# Patient Record
Sex: FEMALE | Race: BLACK OR AFRICAN AMERICAN | NOT HISPANIC OR LATINO | Employment: FULL TIME | URBAN - METROPOLITAN AREA
[De-identification: names, ages, dates, MRNs, and addresses within clinical notes are randomized per-mention and may not be internally consistent; named-entity substitution may affect disease eponyms.]

---

## 2020-01-02 ENCOUNTER — OFFICE VISIT (OUTPATIENT)
Dept: OBSTETRICS AND GYNECOLOGY | Facility: CLINIC | Age: 25
End: 2020-01-02
Payer: MEDICAID

## 2020-01-02 VITALS
SYSTOLIC BLOOD PRESSURE: 124 MMHG | HEIGHT: 65 IN | WEIGHT: 129.63 LBS | BODY MASS INDEX: 21.6 KG/M2 | DIASTOLIC BLOOD PRESSURE: 88 MMHG

## 2020-01-02 DIAGNOSIS — Z11.3 SCREEN FOR STD (SEXUALLY TRANSMITTED DISEASE): Primary | ICD-10-CM

## 2020-01-02 DIAGNOSIS — N76.0 ACUTE VAGINITIS: ICD-10-CM

## 2020-01-02 DIAGNOSIS — R10.2 PELVIC PAIN: ICD-10-CM

## 2020-01-02 LAB
C TRACH DNA SPEC QL NAA+PROBE: NOT DETECTED
N GONORRHOEA DNA SPEC QL NAA+PROBE: NOT DETECTED

## 2020-01-02 PROCEDURE — 99204 PR OFFICE/OUTPT VISIT, NEW, LEVL IV, 45-59 MIN: ICD-10-PCS | Mod: S$PBB,,, | Performed by: NURSE PRACTITIONER

## 2020-01-02 PROCEDURE — 99204 OFFICE O/P NEW MOD 45 MIN: CPT | Mod: S$PBB,,, | Performed by: NURSE PRACTITIONER

## 2020-01-02 PROCEDURE — 99999 PR PBB SHADOW E&M-NEW PATIENT-LVL III: ICD-10-PCS | Mod: PBBFAC,,, | Performed by: NURSE PRACTITIONER

## 2020-01-02 PROCEDURE — 87481 CANDIDA DNA AMP PROBE: CPT | Mod: 59

## 2020-01-02 PROCEDURE — 99999 PR PBB SHADOW E&M-NEW PATIENT-LVL III: CPT | Mod: PBBFAC,,, | Performed by: NURSE PRACTITIONER

## 2020-01-02 PROCEDURE — 87661 TRICHOMONAS VAGINALIS AMPLIF: CPT

## 2020-01-02 PROCEDURE — 87491 CHLMYD TRACH DNA AMP PROBE: CPT

## 2020-01-02 PROCEDURE — 99203 OFFICE O/P NEW LOW 30 MIN: CPT | Mod: PBBFAC,PN | Performed by: NURSE PRACTITIONER

## 2020-01-03 LAB
BACTERIAL VAGINOSIS DNA: POSITIVE
CANDIDA GLABRATA DNA: NEGATIVE
CANDIDA KRUSEI DNA: NEGATIVE
CANDIDA RRNA VAG QL PROBE: NEGATIVE
T VAGINALIS RRNA GENITAL QL PROBE: NEGATIVE

## 2020-01-10 ENCOUNTER — PATIENT MESSAGE (OUTPATIENT)
Dept: OBSTETRICS AND GYNECOLOGY | Facility: CLINIC | Age: 25
End: 2020-01-10

## 2020-01-10 DIAGNOSIS — B96.89 BV (BACTERIAL VAGINOSIS): Primary | ICD-10-CM

## 2020-01-10 DIAGNOSIS — N76.0 BV (BACTERIAL VAGINOSIS): Primary | ICD-10-CM

## 2020-01-10 DIAGNOSIS — B37.31 YEAST VAGINITIS: Primary | ICD-10-CM

## 2020-01-10 RX ORDER — METRONIDAZOLE 500 MG/1
500 TABLET ORAL 2 TIMES DAILY
Qty: 14 TABLET | Refills: 0 | Status: SHIPPED | OUTPATIENT
Start: 2020-01-10 | End: 2020-01-17

## 2020-01-10 RX ORDER — FLUCONAZOLE 150 MG/1
150 TABLET ORAL ONCE
Qty: 1 TABLET | Refills: 1 | Status: SHIPPED | OUTPATIENT
Start: 2020-01-10 | End: 2020-01-10 | Stop reason: CLARIF
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
## 2020-12-07 ENCOUNTER — OFFICE VISIT (OUTPATIENT)
Dept: OBSTETRICS AND GYNECOLOGY | Facility: CLINIC | Age: 25
End: 2020-12-07
Payer: MEDICAID

## 2020-12-07 VITALS
DIASTOLIC BLOOD PRESSURE: 66 MMHG | WEIGHT: 125.25 LBS | HEIGHT: 65 IN | BODY MASS INDEX: 20.87 KG/M2 | SYSTOLIC BLOOD PRESSURE: 110 MMHG

## 2020-12-07 DIAGNOSIS — F31.9 BIPOLAR AFFECTIVE DISORDER, REMISSION STATUS UNSPECIFIED: ICD-10-CM

## 2020-12-07 DIAGNOSIS — R10.2 PELVIC PAIN: Primary | ICD-10-CM

## 2020-12-07 DIAGNOSIS — K57.90 DIVERTICULOSIS: ICD-10-CM

## 2020-12-07 DIAGNOSIS — N93.9 VAGINAL BLEEDING: ICD-10-CM

## 2020-12-07 DIAGNOSIS — R10.31 RIGHT LOWER QUADRANT PAIN: ICD-10-CM

## 2020-12-07 DIAGNOSIS — K92.1 BLOOD IN STOOL: ICD-10-CM

## 2020-12-07 LAB
B-HCG UR QL: NEGATIVE
BILIRUB SERPL-MCNC: NORMAL MG/DL
BILIRUB UR QL STRIP: NEGATIVE
BLOOD URINE, POC: NORMAL
CLARITY UR: CLEAR
CLARITY, POC UA: CLEAR
COLOR UR: YELLOW
COLOR, POC UA: NORMAL
CTP QC/QA: YES
GLUCOSE UR QL STRIP: NEGATIVE
GLUCOSE UR QL STRIP: NORMAL
HGB UR QL STRIP: NEGATIVE
KETONES UR QL STRIP: NEGATIVE
KETONES UR QL STRIP: NORMAL
LEUKOCYTE ESTERASE UR QL STRIP: NEGATIVE
LEUKOCYTE ESTERASE URINE, POC: NORMAL
NITRITE UR QL STRIP: NEGATIVE
NITRITE, POC UA: NORMAL
PH UR STRIP: 8 [PH] (ref 5–8)
PH, POC UA: 8
PROT UR QL STRIP: NEGATIVE
PROTEIN, POC: NORMAL
SP GR UR STRIP: 1.01 (ref 1–1.03)
SPECIFIC GRAVITY, POC UA: 1.01
URN SPEC COLLECT METH UR: NORMAL
UROBILINOGEN UR STRIP-ACNC: NEGATIVE EU/DL
UROBILINOGEN, POC UA: NORMAL

## 2020-12-07 PROCEDURE — 87086 URINE CULTURE/COLONY COUNT: CPT

## 2020-12-07 PROCEDURE — 99999 PR PBB SHADOW E&M-EST. PATIENT-LVL III: CPT | Mod: PBBFAC,,, | Performed by: STUDENT IN AN ORGANIZED HEALTH CARE EDUCATION/TRAINING PROGRAM

## 2020-12-07 PROCEDURE — 99213 OFFICE O/P EST LOW 20 MIN: CPT | Mod: PBBFAC,PN | Performed by: STUDENT IN AN ORGANIZED HEALTH CARE EDUCATION/TRAINING PROGRAM

## 2020-12-07 PROCEDURE — 99214 OFFICE O/P EST MOD 30 MIN: CPT | Mod: S$PBB,,, | Performed by: STUDENT IN AN ORGANIZED HEALTH CARE EDUCATION/TRAINING PROGRAM

## 2020-12-07 PROCEDURE — 81002 URINALYSIS NONAUTO W/O SCOPE: CPT | Mod: PBBFAC,PN | Performed by: STUDENT IN AN ORGANIZED HEALTH CARE EDUCATION/TRAINING PROGRAM

## 2020-12-07 PROCEDURE — 99214 PR OFFICE/OUTPT VISIT, EST, LEVL IV, 30-39 MIN: ICD-10-PCS | Mod: S$PBB,,, | Performed by: STUDENT IN AN ORGANIZED HEALTH CARE EDUCATION/TRAINING PROGRAM

## 2020-12-07 PROCEDURE — 99999 PR PBB SHADOW E&M-EST. PATIENT-LVL III: ICD-10-PCS | Mod: PBBFAC,,, | Performed by: STUDENT IN AN ORGANIZED HEALTH CARE EDUCATION/TRAINING PROGRAM

## 2020-12-07 RX ORDER — CIPROFLOXACIN 500 MG/1
500 TABLET ORAL
COMMUNITY
Start: 2020-11-07 | End: 2023-01-24 | Stop reason: CLARIF

## 2020-12-07 NOTE — PROGRESS NOTES
"History & Physical  Gynecology      SUBJECTIVE:     Chief Complaint: Ovarian Cyst and Vaginal Bleeding       History of Present Illness:  Fredis Moreno is a 25 y.o.  here for right sided pelvic/abdominal pain x 2 months. Symptoms are worsening. She has not tried anything to help the pain although she has gone to the ED several times ([facility], [facility]).  Reports CT scan without contrast (iodine allergy) at [facility] showed diverticulosis as well as possible ovarian cysts +/- rupture.  Stated she left [facility] after 10 hours before any treatment was given. States she doesn't think she saw a doctor by that point.     She is having irregular vaginal bleeding though report she has "no cycles on the Mirena."  Also reports bloody stools. Reports 50 lb unintentional weight loss however upon chart review patient is 125 lb today and was 129 lb in January.  Endorses F/C at home, none currently.    Also struggling with stress (living situation), anger management, and grief after losing her mother 3 years ago.  Reports her physical symptoms always start/worsen with stress and improve when she's coping better. She has a history of mental illness/inpatient psych stay, currently on no meds and not seeing counselor. She does not want to be on medication but is willing to try outpatient counseling again. Denies SI/HI.      Review of patient's allergies indicates:   Allergen Reactions    Amoxicillin Hives    Aleve [naproxen sodium] Hives and Edema     Eye edema    Iodine     Motrin [ibuprofen] Hives     Eye edema       Past Medical History:   Diagnosis Date    Asthma     Eczema      Past Surgical History:   Procedure Laterality Date    HAND SURGERY       OB History        1    Para        Term                AB        Living   1       SAB        TAB        Ectopic        Multiple        Live Births                   Family History   Problem Relation Age of Onset    Breast cancer Maternal Grandmother     Breast " cancer Mother     Anemia Mother     Ovarian cancer Maternal Aunt     Ovarian cancer Paternal Aunt     Colon cancer Neg Hx      Social History     Tobacco Use    Smoking status: Never Smoker   Substance Use Topics    Alcohol use: Not Currently    Drug use: Not Currently     Types: Marijuana       Current Outpatient Medications   Medication Sig    ciprofloxacin HCl (CIPRO) 500 MG tablet 500 mg.    prenatal vit-iron fum-folic ac 27 mg iron- 0.8 mg Tab Take 1 tablet by mouth.     No current facility-administered medications for this visit.          Review of Systems:  Review of Systems   Constitutional: Positive for chills, fever and unexpected weight change. Negative for appetite change and fatigue.   HENT: Negative for nasal congestion.    Respiratory: Negative for cough and shortness of breath.    Cardiovascular: Negative for chest pain and leg swelling.   Gastrointestinal: Positive for abdominal pain, blood in stool, nausea and vomiting. Negative for bloating.   Genitourinary: Positive for pelvic pain. Negative for dysuria and vaginal discharge.   Musculoskeletal: Negative for myalgias.   Integumentary:  Negative for rash.   Neurological: Negative for syncope.   Psychiatric/Behavioral: Positive for depression.        OBJECTIVE:     Physical Exam:  Physical Exam  Exam conducted with a chaperone present.   Constitutional:       General: She is not in acute distress.     Appearance: Normal appearance. She is well-developed. She is not toxic-appearing.   HENT:      Head: Normocephalic and atraumatic.   Eyes:      Conjunctiva/sclera: Conjunctivae normal.   Pulmonary:      Effort: Pulmonary effort is normal. No respiratory distress.   Abdominal:      General: There is no distension.      Palpations: Abdomen is soft. There is no mass.      Tenderness: There is abdominal tenderness (RLQ). There is no guarding or rebound.      Hernia: No hernia is present.   Genitourinary:     General: Normal vulva.      Pubic Area:  No rash.       Labia:         Right: No rash, tenderness or lesion.         Left: No rash, tenderness or lesion.       Urethra: No prolapse, urethral pain, urethral swelling or urethral lesion.      Vagina: No vaginal discharge, tenderness or bleeding.      Cervix: No cervical motion tenderness, discharge, friability or lesion.      Uterus: Normal. Not enlarged and not tender.       Adnexa: Left adnexa normal.        Right: Tenderness present. No mass or fullness.          Left: No mass, tenderness or fullness.        Rectum: Normal. No mass, external hemorrhoid or internal hemorrhoid.   Musculoskeletal: Normal range of motion.         General: No tenderness.      Right lower leg: No edema.      Left lower leg: No edema.   Skin:     General: Skin is warm and dry.      Findings: No erythema.   Neurological:      Mental Status: She is alert and oriented to person, place, and time.   Psychiatric:         Mood and Affect: Mood normal.         Behavior: Behavior normal.           ASSESSMENT:       ICD-10-CM ICD-9-CM    1. Pelvic pain  R10.2 [id number] US Pelvis Complete Non OB      Urine culture      C. trachomatis/N. gonorrhoeae by AMP DNA      POCT urine pregnancy      Urinalysis   2. Vaginal bleeding  N93.9 623.8 POCT URINE DIPSTICK WITHOUT MICROSCOPE   3. Right lower quadrant pain  R10.31 789.03 US Pelvis Complete Non OB   4. Diverticulosis  K57.90 562.10 Ambulatory referral/consult to Gastroenterology      POCT urine pregnancy   5. Blood in stool  K92.1 578.1 Ambulatory referral/consult to Gastroenterology   6. Bipolar affective disorder, remission status unspecified  F31.9 296.80           Plan:      Fredis was seen today for ovarian cyst and vaginal bleeding.    Diagnoses and all orders for this visit:    Pelvic pain  -     US Pelvis Complete Non OB; Future  -     Urine culture  -     C. trachomatis/N. gonorrhoeae by AMP DNA  -     POCT urine pregnancy  -     Urinalysis      If issues with cysts, depending on cyst  type discussed CHC use to prevent ovulation.    Vaginal bleeding  -     POCT URINE DIPSTICK WITHOUT MICROSCOPE   Neg UPT    Right lower quadrant pain  -     US Pelvis Complete Non OB; Future    Diverticulosis  -     Ambulatory referral/consult to Gastroenterology; Future  -     POCT urine pregnancy    Blood in stool  -     Ambulatory referral/consult to Gastroenterology; Future    Bipolar affective disorder, remission status unspecified   Local behavioral health resources provided      Orders Placed This Encounter   Procedures    Urine culture    C. trachomatis/N. gonorrhoeae by AMP DNA    US Pelvis Complete Non OB    Urinalysis    Ambulatory referral/consult to Gastroenterology    POCT URINE DIPSTICK WITHOUT MICROSCOPE    POCT urine pregnancy       Follow up if symptoms worsen or fail to improve.     Counseling time: 45 minutes    Mary Scherer

## 2020-12-09 LAB
BACTERIA UR CULT: NO GROWTH
C TRACH RRNA SPEC QL NAA+PROBE: NEGATIVE
N GONORRHOEA RRNA SPEC QL NAA+PROBE: NEGATIVE

## 2020-12-10 ENCOUNTER — TELEPHONE (OUTPATIENT)
Dept: OBSTETRICS AND GYNECOLOGY | Facility: CLINIC | Age: 25
End: 2020-12-10

## 2020-12-11 ENCOUNTER — OFFICE VISIT (OUTPATIENT)
Dept: OBSTETRICS AND GYNECOLOGY | Facility: CLINIC | Age: 25
End: 2020-12-11
Payer: MEDICAID

## 2020-12-11 VITALS
DIASTOLIC BLOOD PRESSURE: 78 MMHG | HEIGHT: 65 IN | BODY MASS INDEX: 21.86 KG/M2 | WEIGHT: 131.19 LBS | SYSTOLIC BLOOD PRESSURE: 116 MMHG

## 2020-12-11 DIAGNOSIS — Z30.49 ENCOUNTER FOR SURVEILLANCE OF OTHER CONTRACEPTIVE: Primary | ICD-10-CM

## 2020-12-11 PROCEDURE — 99499 NO LOS: ICD-10-PCS | Mod: S$PBB,,, | Performed by: ADVANCED PRACTICE MIDWIFE

## 2020-12-11 PROCEDURE — 99499 UNLISTED E&M SERVICE: CPT | Mod: S$PBB,,, | Performed by: ADVANCED PRACTICE MIDWIFE

## 2020-12-11 PROCEDURE — 99213 OFFICE O/P EST LOW 20 MIN: CPT | Mod: PBBFAC,PN | Performed by: ADVANCED PRACTICE MIDWIFE

## 2020-12-11 PROCEDURE — 99999 PR PBB SHADOW E&M-EST. PATIENT-LVL III: CPT | Mod: PBBFAC,,, | Performed by: ADVANCED PRACTICE MIDWIFE

## 2020-12-11 PROCEDURE — 99999 PR PBB SHADOW E&M-EST. PATIENT-LVL III: ICD-10-PCS | Mod: PBBFAC,,, | Performed by: ADVANCED PRACTICE MIDWIFE

## 2020-12-11 NOTE — PROGRESS NOTES
Pt in today for evaluation per gastroenterology as recommended per Dr. Krishnamurthy on 12/07/20 sec to bloody stool and rectal bleeding. Discussed US results with IUD in lower uterine segment and recommendation that we take it out. Pt reports it was placed in 2016 at [facility]. Pt desires to have another IUD placed and desires to wait for removal at the same  time. Advised that is probably contributing to her vaginal bleeding and is not as effective in preventing pregnancy. Pt aware- reports not currently sexually active. Device authorization placed. GI appt scheduled.

## 2020-12-16 ENCOUNTER — LAB VISIT (OUTPATIENT)
Dept: LAB | Facility: HOSPITAL | Age: 25
End: 2020-12-16
Payer: MEDICAID

## 2020-12-16 ENCOUNTER — OFFICE VISIT (OUTPATIENT)
Dept: GASTROENTEROLOGY | Facility: CLINIC | Age: 25
End: 2020-12-16
Payer: MEDICAID

## 2020-12-16 VITALS
SYSTOLIC BLOOD PRESSURE: 95 MMHG | BODY MASS INDEX: 21.41 KG/M2 | WEIGHT: 128.5 LBS | HEART RATE: 85 BPM | HEIGHT: 65 IN | DIASTOLIC BLOOD PRESSURE: 69 MMHG

## 2020-12-16 DIAGNOSIS — R19.7 DIARRHEA, UNSPECIFIED TYPE: Primary | ICD-10-CM

## 2020-12-16 DIAGNOSIS — K92.1 BLOOD IN STOOL: ICD-10-CM

## 2020-12-16 DIAGNOSIS — R10.9 ABDOMINAL PAIN, UNSPECIFIED ABDOMINAL LOCATION: ICD-10-CM

## 2020-12-16 DIAGNOSIS — R15.2 FECAL URGENCY: ICD-10-CM

## 2020-12-16 DIAGNOSIS — R19.7 DIARRHEA, UNSPECIFIED TYPE: ICD-10-CM

## 2020-12-16 LAB
CRP SERPL-MCNC: 1.1 MG/L (ref 0–8.2)
IGA SERPL-MCNC: 238 MG/DL (ref 40–350)

## 2020-12-16 PROCEDURE — 86140 C-REACTIVE PROTEIN: CPT

## 2020-12-16 PROCEDURE — 99204 OFFICE O/P NEW MOD 45 MIN: CPT | Mod: S$PBB,,, | Performed by: NURSE PRACTITIONER

## 2020-12-16 PROCEDURE — 99213 OFFICE O/P EST LOW 20 MIN: CPT | Mod: PBBFAC | Performed by: NURSE PRACTITIONER

## 2020-12-16 PROCEDURE — 99999 PR PBB SHADOW E&M-EST. PATIENT-LVL III: CPT | Mod: PBBFAC,,, | Performed by: NURSE PRACTITIONER

## 2020-12-16 PROCEDURE — 99204 PR OFFICE/OUTPT VISIT, NEW, LEVL IV, 45-59 MIN: ICD-10-PCS | Mod: S$PBB,,, | Performed by: NURSE PRACTITIONER

## 2020-12-16 PROCEDURE — 99999 PR PBB SHADOW E&M-EST. PATIENT-LVL III: ICD-10-PCS | Mod: PBBFAC,,, | Performed by: NURSE PRACTITIONER

## 2020-12-16 PROCEDURE — 83516 IMMUNOASSAY NONANTIBODY: CPT

## 2020-12-16 PROCEDURE — 36415 COLL VENOUS BLD VENIPUNCTURE: CPT

## 2020-12-16 PROCEDURE — 82784 ASSAY IGA/IGD/IGG/IGM EACH: CPT

## 2020-12-16 RX ORDER — DICYCLOMINE HYDROCHLORIDE 10 MG/1
10 CAPSULE ORAL 3 TIMES DAILY
Qty: 90 CAPSULE | Refills: 1 | Status: SHIPPED | OUTPATIENT
Start: 2020-12-16 | End: 2021-02-14

## 2020-12-16 NOTE — PROGRESS NOTES
"    [facility] Consultation Note    Reason for Consult:  The primary encounter diagnosis was Diarrhea, unspecified type. Diagnoses of Blood in stool, Abdominal pain, unspecified abdominal location, and Fecal urgency were also pertinent to this visit.    PCP:   Primary Doctor No   [id number] / [place] [address]    Referring MD:  Mary Scherer Md  [address] [address]    Initial History of Present Illness (HPI):  This is a 25 y.o. female here for evaluation of a number of GI complaints. New patient.  Beginning of October  Patient reports she has developed hematochezia, abdominal pain and diarrhea.   She has diarrhea everyday. Has more than 5 Bms in a day. +nocturnal BMs  +mucous in the stools for years. Reports she self diagnosed herself with IBS in High school  Abdominal pain - post prandial. Pain does improve after a BM. But she continues with rectal pain  +urgency  NSAID usage - goodys, but she is allergic to ibuprofen    She reports a 20 lb weight loss, but       ROS:  Constitutional: +fevers, +chills, +weight loss  ENT:  No sore throat, no PND  Eyes:+has eye redness two days ago  CV: + chest pain, no palpitation  Pulm: No cough, + shortness of breath, no wheezing  Ophtho: No vision changes  GI: see HPI  Derm: No rash, no itching  Heme: No easy bruising  MSK: + occasional joint pains  : + dysuria, No hematuria  Neuro+syncope, No seizure  Psych: +anxiety, + depression "I have this very bad" per pt    Medical History:  has a past medical history of Asthma and Eczema.    Surgical History:  has a past surgical history that includes Hand surgery.    Family History: family history includes Anemia in her mother; Breast cancer in her maternal grandmother and mother; Ovarian cancer in her maternal aunt and paternal aunt..     Social History:  reports that she has never smoked. She does not have any smokeless tobacco history on file. She reports previous alcohol " "use. She reports previous drug use. Drug: Marijuana.    Review of patient's allergies indicates:   Allergen Reactions    Amoxicillin Hives    Aleve [naproxen sodium] Hives and Edema     Eye edema    Iodine     Motrin [ibuprofen] Hives     Eye edema       Medication List with Changes/Refills   New Medications    DICYCLOMINE (BENTYL) 10 MG CAPSULE    Take 1 capsule (10 mg total) by mouth 3 (three) times daily.   Current Medications    CIPROFLOXACIN HCL (CIPRO) 500 MG TABLET    500 mg.    PRENATAL VIT-IRON FUM-FOLIC AC 27 MG IRON- 0.8 MG TAB    Take 1 tablet by mouth.         Objective Findings:    Vital Signs   reviewed:  BP 95/69 (BP Location: Right arm)   Pulse 85   Ht 5' 5" (1.651 m)   Wt 58.3 kg (128 lb 8.5 oz)   BMI 21.39 kg/m²   Body mass index is 21.39 kg/m².    Physical Exam:  General Appearance: Well appearing, NAD noted  Eyes:    No scleral icterus  ENT:  No lesions or masses   Lungs: BBS CTA ,  no wheezes  Heart:  HRRR, S1 & S2 normal, no murmurs heard  Abdomen:  Non distended, soft, no guarding, no rebound, no tenderness, no appreciated ascites  Musculoskeletal:  No major joint deformities  Skin: No petechiae or rash on exposed skin areas  Neurologic:  Alert and oriented x4  Psychiatric:  Normal speech mentation and affect    Labs reviewed:  No results found for: WBC, HGB, HCT, PLT, CHOL, TRIG, HDL, LDLDIRECT, ALT, AST, NA, K, CL, CREATININE, BUN, CO2, TSH, PSA, INR, GLUF, HGBA1C, MICROALBUR     CBC, cmp were reviewed in Care everywhere normal 12/2020    Imaging reviewed:  None    Endoscopy reviewed:    None    Medical Decision Making:    Assessment:    Fredis Moreno is a 25 y.o. female here for:    1. Diarrhea, unspecified type    2. Blood in stool    3. Abdominal pain, unspecified abdominal location    4. Fecal urgency       Chronic sx but has not had evaluation.  The main thing to rule out here is IBD.  She reports a 20 lb weight loss which is concerning however chart review does not " support a 20 lb weight loss.  She also reports that the stool always has a significant amount of blood.   CBC& cmp reviewed in Care everywhere from earlier this month was unremarkable    Recommendations:  1. Labs and stool studies  2. Colonoscopy  3. Trial bentyl up to TID before meals. Pt cautioned this could cause drowsiness    F/u pending order results     Order summary:  Orders Placed This Encounter    Stool culture    Clostridium difficile EIA    Calprotectin, Stool    C-Reactive Protein    Stool Exam-Ova,Cysts,Parasites    WBC, Stool    Giardia / Cryptosporidum, EIA    Tissue Transglutaminase, IgA    IgA    dicyclomine (BENTYL) 10 MG capsule    Case Request Endoscopy: COLONOSCOPY         Thank you for allowing me to participate in the care of Fredis Garcia, SAGARP-C

## 2020-12-16 NOTE — LETTER
December 16, 2020      Mary Krishnamurthy MD  [address]  [facility] [address]           [facility] [address]th [address]  [address]  [facility] [address]  Phone: [phone]  Fax: [phone]          Patient: Fredis Moreno   MR Number: [id number]   YOB: 1995   Date of Visit: 12/16/2020       Dear Dr. Mary Krishnamurthy:    Thank you for referring Fredis Moreno to me for evaluation. Attached you will find relevant portions of my assessment and plan of care.    If you have questions, please do not hesitate to call me. I look forward to following Fredis Moreno along with you.    Sincerely,    Kay Garcia, YARELIS    Enclosure  CC:  No Recipients    If you would like to receive this communication electronically, please contact [email or web address] or [phone] to request more information on [employer] access.    For providers and/or their staff who would like to refer a patient to [facility], please contact us through our one-stop-shop provider referral line, [facility], at [phone].    If you feel you have received this communication in error or would no longer like to receive these types of communications, please e-mail [email or web address]

## 2020-12-17 ENCOUNTER — APPOINTMENT (OUTPATIENT)
Dept: LAB | Facility: HOSPITAL | Age: 25
End: 2020-12-17
Payer: MEDICAID

## 2020-12-18 ENCOUNTER — TELEPHONE (OUTPATIENT)
Dept: GASTROENTEROLOGY | Facility: CLINIC | Age: 25
End: 2020-12-18

## 2020-12-18 NOTE — TELEPHONE ENCOUNTER
----- Message from Radha Acosta sent at 12/18/2020  1:24 AM CST -----  Regarding: Lab Client Services  Contact: [phone]  Good Morning,    My name is Radha Acosta I work in the lab. We received a specimen for Clostridium Difficile test. The test was unable to be performed due to the stool sample was formed stool. If have any questions regarding this, please contact Lab Client Services at [phone].     Thank you

## 2020-12-21 ENCOUNTER — TELEPHONE (OUTPATIENT)
Dept: GASTROENTEROLOGY | Facility: CLINIC | Age: 25
End: 2020-12-21

## 2020-12-21 DIAGNOSIS — Z01.818 PRE-OP TESTING: ICD-10-CM

## 2020-12-21 DIAGNOSIS — Z12.11 SPECIAL SCREENING FOR MALIGNANT NEOPLASMS, COLON: Primary | ICD-10-CM

## 2020-12-21 LAB — TTG IGA SER-ACNC: 5 UNITS

## 2020-12-21 RX ORDER — SODIUM, POTASSIUM,MAG SULFATES 17.5-3.13G
1 SOLUTION, RECONSTITUTED, ORAL ORAL DAILY
Qty: 1 KIT | Refills: 0 | Status: SHIPPED | OUTPATIENT
Start: 2020-12-21 | End: 2020-12-23

## 2020-12-21 NOTE — TELEPHONE ENCOUNTER
MA contacted Lab Services and spoke with Rosalie . she stated that pt stool was not sent to send out and that the stool test for parsites was canceled due to stool sitting too long.       ----- Message from Kay Garcia NP sent at 12/21/2020  8:36 AM CST -----  Regarding: FW: Lab Client Services  Will you call them please. Thanks  ----- Message -----  From: Madi Lerma  Sent: 12/20/2020   5:50 PM CST  To: Kay Garcia NP, #  Subject: Lab Client Services                              Hi my name is Madi I work in the Lab Client Services. We had an issue with a lab on your patient. If you could please give us a call at [phone] that would be great. ANYONE in my department can help. Thank You.

## 2020-12-21 NOTE — TELEPHONE ENCOUNTER
MA contacted pt to discus stool test with pt. Pt did not answer, MA LVM for pt to give the clinic a call .

## 2020-12-21 NOTE — TELEPHONE ENCOUNTER
MA contacted pt back. MA wanted to give lab result and discuss stool test with pt. No answer, MA LVM for pt to give the clinic a call .      ----- Message from Kay Garcia NP sent at 12/21/2020 12:25 PM CST -----  Labs for celiac disease normal

## 2020-12-30 ENCOUNTER — TELEPHONE (OUTPATIENT)
Dept: GASTROENTEROLOGY | Facility: CLINIC | Age: 25
End: 2020-12-30

## 2020-12-30 NOTE — TELEPHONE ENCOUNTER
MA contacted lab services and spoke with Ernestina . The C.Diff test was not ran because pt stool was a formed stool. Ernestina transferred MA to Micro lab to speak to them about the Giardia stool sample. [facility] a [facility] from the lab to notify them of testing . This is not our fault here in the GI  clinic she stated that a specimen label should have been sent over with the stool sample so that they would know that test needed to be ran.         ----- Message from Kay Garcia NP sent at 12/30/2020  8:31 AM CST -----  Call lab about the giardia stool test. Why wasn't it ran.

## 2020-12-30 NOTE — TELEPHONE ENCOUNTER
----- Message from Kay Garcia NP sent at 12/30/2020  8:31 AM CST -----  Call lab about the giardia stool test. Why wasn't it ran.

## 2021-01-04 ENCOUNTER — TELEPHONE (OUTPATIENT)
Dept: ENDOSCOPY | Facility: HOSPITAL | Age: 26
End: 2021-01-04

## 2021-01-04 DIAGNOSIS — Z12.11 SPECIAL SCREENING FOR MALIGNANT NEOPLASMS, COLON: Primary | ICD-10-CM

## 2021-01-04 DIAGNOSIS — Z01.818 PRE-OP TESTING: ICD-10-CM

## 2021-01-04 RX ORDER — SODIUM, POTASSIUM,MAG SULFATES 17.5-3.13G
1 SOLUTION, RECONSTITUTED, ORAL ORAL DAILY
Qty: 1 KIT | Refills: 0 | Status: SHIPPED | OUTPATIENT
Start: 2021-01-04 | End: 2021-01-06

## 2021-01-07 ENCOUNTER — TELEPHONE (OUTPATIENT)
Dept: GASTROENTEROLOGY | Facility: CLINIC | Age: 26
End: 2021-01-07

## 2021-01-08 ENCOUNTER — LAB VISIT (OUTPATIENT)
Dept: INTERNAL MEDICINE | Facility: CLINIC | Age: 26
End: 2021-01-08
Payer: MEDICAID

## 2021-01-08 ENCOUNTER — TELEPHONE (OUTPATIENT)
Dept: GASTROENTEROLOGY | Facility: CLINIC | Age: 26
End: 2021-01-08

## 2021-01-08 DIAGNOSIS — Z01.818 PRE-OP TESTING: ICD-10-CM

## 2021-01-08 PROCEDURE — U0003 INFECTIOUS AGENT DETECTION BY NUCLEIC ACID (DNA OR RNA); SEVERE ACUTE RESPIRATORY SYNDROME CORONAVIRUS 2 (SARS-COV-2) (CORONAVIRUS DISEASE [COVID-19]), AMPLIFIED PROBE TECHNIQUE, MAKING USE OF HIGH THROUGHPUT TECHNOLOGIES AS DESCRIBED BY CMS-2020-01-R: HCPCS

## 2021-01-09 LAB — SARS-COV-2 RNA RESP QL NAA+PROBE: NOT DETECTED

## 2021-01-11 ENCOUNTER — ANESTHESIA (OUTPATIENT)
Dept: ENDOSCOPY | Facility: HOSPITAL | Age: 26
End: 2021-01-11
Payer: MEDICAID

## 2021-01-11 ENCOUNTER — HOSPITAL ENCOUNTER (OUTPATIENT)
Facility: HOSPITAL | Age: 26
Discharge: HOME | End: 2021-01-11
Attending: INTERNAL MEDICINE | Admitting: INTERNAL MEDICINE
Payer: MEDICAID

## 2021-01-11 ENCOUNTER — ANESTHESIA EVENT (OUTPATIENT)
Dept: ENDOSCOPY | Facility: HOSPITAL | Age: 26
End: 2021-01-11
Payer: MEDICAID

## 2021-01-11 VITALS
WEIGHT: 123 LBS | SYSTOLIC BLOOD PRESSURE: 115 MMHG | HEIGHT: 65 IN | DIASTOLIC BLOOD PRESSURE: 73 MMHG | HEART RATE: 83 BPM | RESPIRATION RATE: 16 BRPM | BODY MASS INDEX: 20.49 KG/M2 | TEMPERATURE: 98 F | OXYGEN SATURATION: 99 %

## 2021-01-11 DIAGNOSIS — R19.7 DIARRHEA: ICD-10-CM

## 2021-01-11 LAB
B-HCG UR QL: NEGATIVE
CTP QC/QA: YES

## 2021-01-11 PROCEDURE — E9220 PRA ENDO ANESTHESIA: ICD-10-PCS | Mod: ,,, | Performed by: NURSE ANESTHETIST, CERTIFIED REGISTERED

## 2021-01-11 PROCEDURE — 25000003 PHARM REV CODE 250: Performed by: NURSE ANESTHETIST, CERTIFIED REGISTERED

## 2021-01-11 PROCEDURE — 45380 PR COLONOSCOPY,BIOPSY: ICD-10-PCS | Mod: ,,, | Performed by: INTERNAL MEDICINE

## 2021-01-11 PROCEDURE — 27201012 HC FORCEPS, HOT/COLD, DISP: Performed by: INTERNAL MEDICINE

## 2021-01-11 PROCEDURE — 45380 COLONOSCOPY AND BIOPSY: CPT | Mod: ,,, | Performed by: INTERNAL MEDICINE

## 2021-01-11 PROCEDURE — 88305 TISSUE EXAM BY PATHOLOGIST: CPT | Mod: 26,,, | Performed by: PATHOLOGY

## 2021-01-11 PROCEDURE — 88305 TISSUE EXAM BY PATHOLOGIST: ICD-10-PCS | Mod: 26,,, | Performed by: PATHOLOGY

## 2021-01-11 PROCEDURE — 00813 ANES UPR LWR GI NDSC PX: CPT | Performed by: INTERNAL MEDICINE

## 2021-01-11 PROCEDURE — 37000009 HC ANESTHESIA EA ADD 15 MINS: Performed by: INTERNAL MEDICINE

## 2021-01-11 PROCEDURE — 25000003 PHARM REV CODE 250: Performed by: INTERNAL MEDICINE

## 2021-01-11 PROCEDURE — 37000008 HC ANESTHESIA 1ST 15 MINUTES: Performed by: INTERNAL MEDICINE

## 2021-01-11 PROCEDURE — 45380 COLONOSCOPY AND BIOPSY: CPT | Performed by: INTERNAL MEDICINE

## 2021-01-11 PROCEDURE — 81025 URINE PREGNANCY TEST: CPT | Performed by: INTERNAL MEDICINE

## 2021-01-11 PROCEDURE — 63600175 PHARM REV CODE 636 W HCPCS: Performed by: NURSE ANESTHETIST, CERTIFIED REGISTERED

## 2021-01-11 PROCEDURE — 88305 TISSUE EXAM BY PATHOLOGIST: CPT | Performed by: PATHOLOGY

## 2021-01-11 PROCEDURE — E9220 PRA ENDO ANESTHESIA: HCPCS | Mod: ,,, | Performed by: NURSE ANESTHETIST, CERTIFIED REGISTERED

## 2021-01-11 RX ORDER — SODIUM CHLORIDE 9 MG/ML
INJECTION, SOLUTION INTRAVENOUS CONTINUOUS
Status: DISCONTINUED | OUTPATIENT
Start: 2021-01-11 | End: 2021-01-11 | Stop reason: HOSPADM

## 2021-01-11 RX ORDER — PROPOFOL 10 MG/ML
VIAL (ML) INTRAVENOUS CONTINUOUS PRN
Status: DISCONTINUED | OUTPATIENT
Start: 2021-01-11 | End: 2021-01-11

## 2021-01-11 RX ORDER — LIDOCAINE HYDROCHLORIDE 20 MG/ML
INJECTION INTRAVENOUS
Status: DISCONTINUED | OUTPATIENT
Start: 2021-01-11 | End: 2021-01-11

## 2021-01-11 RX ORDER — PROPOFOL 10 MG/ML
VIAL (ML) INTRAVENOUS
Status: DISCONTINUED | OUTPATIENT
Start: 2021-01-11 | End: 2021-01-11

## 2021-01-11 RX ADMIN — PROPOFOL 175 MCG/KG/MIN: 10 INJECTION, EMULSION INTRAVENOUS at 03:01

## 2021-01-11 RX ADMIN — SODIUM CHLORIDE 10 ML/HR: 0.9 INJECTION, SOLUTION INTRAVENOUS at 03:01

## 2021-01-11 RX ADMIN — PROPOFOL 100 MG: 10 INJECTION, EMULSION INTRAVENOUS at 03:01

## 2021-01-11 RX ADMIN — PROPOFOL 30 MG: 10 INJECTION, EMULSION INTRAVENOUS at 03:01

## 2021-01-11 RX ADMIN — PROPOFOL 50 MG: 10 INJECTION, EMULSION INTRAVENOUS at 03:01

## 2021-01-11 RX ADMIN — LIDOCAINE HYDROCHLORIDE 60 MG: 20 INJECTION, SOLUTION INTRAVENOUS at 03:01

## 2021-01-14 LAB
FINAL PATHOLOGIC DIAGNOSIS: NORMAL
GROSS: NORMAL
Lab: NORMAL

## 2021-01-15 ENCOUNTER — PATIENT MESSAGE (OUTPATIENT)
Dept: GASTROENTEROLOGY | Facility: CLINIC | Age: 26
End: 2021-01-15

## 2022-06-28 ENCOUNTER — HOSPITAL ENCOUNTER (EMERGENCY)
Facility: HOSPITAL | Age: 27
Discharge: HOME | End: 2022-06-28
Attending: EMERGENCY MEDICINE
Payer: MEDICAID

## 2022-06-28 VITALS
HEART RATE: 88 BPM | OXYGEN SATURATION: 100 % | TEMPERATURE: 99 F | SYSTOLIC BLOOD PRESSURE: 120 MMHG | HEIGHT: 65 IN | DIASTOLIC BLOOD PRESSURE: 88 MMHG | BODY MASS INDEX: 22.49 KG/M2 | WEIGHT: 135 LBS | RESPIRATION RATE: 18 BRPM

## 2022-06-28 DIAGNOSIS — R07.9 CHEST PAIN: ICD-10-CM

## 2022-06-28 DIAGNOSIS — F41.9 ANXIETY: Primary | ICD-10-CM

## 2022-06-28 LAB
B-HCG UR QL: NEGATIVE
CTP QC/QA: YES

## 2022-06-28 PROCEDURE — 81025 URINE PREGNANCY TEST: CPT | Performed by: EMERGENCY MEDICINE

## 2022-06-28 PROCEDURE — 99284 EMERGENCY DEPT VISIT MOD MDM: CPT | Mod: 25

## 2022-06-28 PROCEDURE — 93010 EKG 12-LEAD: ICD-10-PCS | Mod: ,,, | Performed by: INTERNAL MEDICINE

## 2022-06-28 PROCEDURE — 93010 ELECTROCARDIOGRAM REPORT: CPT | Mod: ,,, | Performed by: INTERNAL MEDICINE

## 2022-06-28 PROCEDURE — 93005 ELECTROCARDIOGRAM TRACING: CPT | Performed by: INTERNAL MEDICINE
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
## 2022-12-16 ENCOUNTER — NURSE TRIAGE (OUTPATIENT)
Dept: ADMINISTRATIVE | Facility: CLINIC | Age: 27
End: 2022-12-16
Payer: MEDICAID

## 2022-12-16 NOTE — TELEPHONE ENCOUNTER
Patient c/o chest pain. States that she is also experiencing a fast heartbeat and dizziness. Advised per protocol to go to the nearest ED now for further evaluation. Patient VU. Advised the patient to call back with any further questions or if symptoms worsen.          Reason for Disposition   Heart beating irregularly or very rapidly    Additional Information   Negative: SEVERE difficulty breathing (e.g., struggling for each breath, speaks in single words)   Negative: Passed out (i.e., fainted, collapsed and was not responding)   Negative: Difficult to awaken or acting confused (e.g., disoriented, slurred speech)   Negative: Shock suspected (e.g., cold/pale/clammy skin, too weak to stand, low BP, rapid pulse)   Negative: Chest pain lasting longer than 5 minutes and ANY of the following:* Over 44 years old* Over 30 years old and at least one cardiac risk factor (e.g., diabetes mellitus, high blood pressure, high cholesterol, smoker, or strong family history of heart disease)* History of heart disease (i.e., angina, heart attack, heart failure, bypass surgery, takes nitroglycerin)* Pain is crushing, pressure-like, or heavy   Negative: Heart beating < 50 beats per minute OR > 140 beats per minute   Negative: Visible sweat on face or sweat dripping down face   Negative: Sounds like a life-threatening emergency to the triager   Negative: SEVERE chest pain   Negative: Pain also in shoulder(s) or arm(s) or jaw   Negative: Difficulty breathing   Negative: Cocaine use within last 3 days   Negative: Major surgery in the past month   Negative: Hip or leg fracture (broken bone) in past month (or had cast on leg or ankle in past month)   Negative: Illness requiring prolonged bedrest in past month (e.g., immobilization, long hospital stay)   Negative: Long-distance travel in past month (e.g., car, bus, train, plane; with trip lasting 6 or more hours)   Negative: History of prior 'blood clot' in leg or lungs (i.e., deep vein  thrombosis, pulmonary embolism)   Negative: History of inherited increased risk of blood clots (e.g., Factor 5 Leiden, Anti-thrombin 3, Protein C or Protein S deficiency, Prothrombin mutation)   Negative: Cancer treatment in the past two months (or has cancer now)    Protocols used: Chest Pain-A-[place]

## 2023-01-18 ENCOUNTER — OFFICE VISIT (OUTPATIENT)
Dept: OBSTETRICS AND GYNECOLOGY | Facility: CLINIC | Age: 28
End: 2023-01-18
Attending: OBSTETRICS & GYNECOLOGY
Payer: MEDICAID

## 2023-01-18 VITALS
WEIGHT: 136 LBS | HEIGHT: 65 IN | DIASTOLIC BLOOD PRESSURE: 66 MMHG | SYSTOLIC BLOOD PRESSURE: 124 MMHG | BODY MASS INDEX: 22.66 KG/M2

## 2023-01-18 DIAGNOSIS — Z01.419 WELL WOMAN EXAM WITH ROUTINE GYNECOLOGICAL EXAM: Primary | ICD-10-CM

## 2023-01-18 DIAGNOSIS — T83.32XA MALPOSITIONED INTRAUTERINE DEVICE (IUD), INITIAL ENCOUNTER: ICD-10-CM

## 2023-01-18 DIAGNOSIS — R35.0 URINARY FREQUENCY: ICD-10-CM

## 2023-01-18 PROBLEM — K57.92 DIVERTICULITIS: Status: ACTIVE | Noted: 2023-01-18

## 2023-01-18 PROCEDURE — 1159F PR MEDICATION LIST DOCUMENTED IN MEDICAL RECORD: ICD-10-PCS | Mod: CPTII,,, | Performed by: OBSTETRICS & GYNECOLOGY

## 2023-01-18 PROCEDURE — 99213 OFFICE O/P EST LOW 20 MIN: CPT | Mod: PBBFAC,PN | Performed by: OBSTETRICS & GYNECOLOGY

## 2023-01-18 PROCEDURE — 3078F PR MOST RECENT DIASTOLIC BLOOD PRESSURE < 80 MM HG: ICD-10-PCS | Mod: CPTII,,, | Performed by: OBSTETRICS & GYNECOLOGY

## 2023-01-18 PROCEDURE — 3008F BODY MASS INDEX DOCD: CPT | Mod: CPTII,,, | Performed by: OBSTETRICS & GYNECOLOGY

## 2023-01-18 PROCEDURE — 1160F RVW MEDS BY RX/DR IN RCRD: CPT | Mod: CPTII,,, | Performed by: OBSTETRICS & GYNECOLOGY

## 2023-01-18 PROCEDURE — 3078F DIAST BP <80 MM HG: CPT | Mod: CPTII,,, | Performed by: OBSTETRICS & GYNECOLOGY

## 2023-01-18 PROCEDURE — 1159F MED LIST DOCD IN RCRD: CPT | Mod: CPTII,,, | Performed by: OBSTETRICS & GYNECOLOGY

## 2023-01-18 PROCEDURE — 99999 PR PBB SHADOW E&M-EST. PATIENT-LVL III: ICD-10-PCS | Mod: PBBFAC,,, | Performed by: OBSTETRICS & GYNECOLOGY

## 2023-01-18 PROCEDURE — 3074F PR MOST RECENT SYSTOLIC BLOOD PRESSURE < 130 MM HG: ICD-10-PCS | Mod: CPTII,,, | Performed by: OBSTETRICS & GYNECOLOGY

## 2023-01-18 PROCEDURE — 3008F PR BODY MASS INDEX (BMI) DOCUMENTED: ICD-10-PCS | Mod: CPTII,,, | Performed by: OBSTETRICS & GYNECOLOGY

## 2023-01-18 PROCEDURE — 87086 URINE CULTURE/COLONY COUNT: CPT | Performed by: OBSTETRICS & GYNECOLOGY

## 2023-01-18 PROCEDURE — 99999 PR PBB SHADOW E&M-EST. PATIENT-LVL III: CPT | Mod: PBBFAC,,, | Performed by: OBSTETRICS & GYNECOLOGY

## 2023-01-18 PROCEDURE — 3074F SYST BP LT 130 MM HG: CPT | Mod: CPTII,,, | Performed by: OBSTETRICS & GYNECOLOGY

## 2023-01-18 PROCEDURE — 1160F PR REVIEW ALL MEDS BY PRESCRIBER/CLIN PHARMACIST DOCUMENTED: ICD-10-PCS | Mod: CPTII,,, | Performed by: OBSTETRICS & GYNECOLOGY

## 2023-01-18 PROCEDURE — 99395 PREV VISIT EST AGE 18-39: CPT | Mod: S$PBB,,, | Performed by: OBSTETRICS & GYNECOLOGY

## 2023-01-18 PROCEDURE — 99395 PR PREVENTIVE VISIT,EST,18-39: ICD-10-PCS | Mod: S$PBB,,, | Performed by: OBSTETRICS & GYNECOLOGY

## 2023-01-18 NOTE — H&P (VIEW-ONLY)
Past medical, surgical, social, family, and obstetric histories; medications; prior records and results; and available outside records were reviewed and updated in the EMR.  Pertinent findings were noted below.    Reason for Visit   Well Woman, STD CHECK, and IUD removal     HPI   27 y.o. female     New patient: Yes    Menopausal: No    History of abnormal paps: DENIES  Abnormal or postmenopausal bleeding: DENIES  History of abnormal mammograms:N/A   Family history of breast or ovarian cancer:  Mother, grandmother, and aunt passed away from breast cancer.  Any breast masses, pain, skin changes, or nipple discharge: DENIES  Possible recent STD exposure: Denies   Contraception: Mirena    Patient here for an annual exam. She has a Mirena IUD in place, inserted approximately 6 years ago. She states she would like her IUD removed. She is in a same sex relationship and states she does not need the IUD anymore.   Patient requesting GC/CT screening. Declines other STD screening at this time.   Extensive family history of breast cancer. Patient unsure if anyone has had genetic testing     Pap: No result found,  Patient unsure of last pap, none is system  Mammogram: N/A  Allergies: Amoxicillin, Aleve [naproxen sodium], Iodine, Motrin [ibuprofen], and Shellfish containing products    Review of Systems   Constitutional:  Negative for chills, fatigue and fever.   HENT:  Negative for nasal congestion.    Eyes:  Negative for visual disturbance.   Respiratory:  Negative for cough and shortness of breath.    Cardiovascular:  Negative for chest pain.   Gastrointestinal:  Negative for abdominal pain, constipation, diarrhea, nausea and vomiting.   Endocrine: Negative for hair loss and hot flashes.   Genitourinary:  Negative for dysuria, frequency, hematuria, menstrual problem, vaginal bleeding and vaginal discharge.   Musculoskeletal:  Negative for back pain.   Integumentary:  Negative for breast mass, nipple discharge and breast  "skin changes.   Neurological:  Negative for headaches.   Hematological:  Does not bruise/bleed easily.   Psychiatric/Behavioral:  Negative for depression.    Breast: Negative for mass, mastodynia, nipple discharge and skin changes    Exam   /66   Ht 5' 5" (1.651 m)   Wt 61.7 kg (136 lb)   BMI 22.63 kg/m²     Physical Exam  Constitutional:       General: She is not in acute distress.     Appearance: Normal appearance.   Genitourinary:      Vulva normal.      Genitourinary Comments: IUD removal attempted and unsuccessful, see below       Right Labia: No rash, lesions or Bartholin's cyst.     Left Labia: No lesions, Bartholin's cyst or rash.     No vaginal discharge or bleeding.        Right Adnexa: not tender and not full.     Left Adnexa: not tender and not full.     No cervical motion tenderness or lesion.      IUD strings visualized.      Uterus is not enlarged or tender.      Pelvic exam was performed with patient in the lithotomy position.   Breasts:     Breasts are symmetrical.      Right: No mass, nipple discharge, skin change or tenderness.      Left: No mass, nipple discharge, skin change or tenderness.   HENT:      Head: Normocephalic and atraumatic.   Eyes:      Pupils: Pupils are equal, round, and reactive to light.   Cardiovascular:      Rate and Rhythm: Normal rate.   Pulmonary:      Effort: Pulmonary effort is normal.   Abdominal:      Palpations: Abdomen is soft. There is no mass.      Tenderness: There is no abdominal tenderness. There is no guarding.   Lymphadenopathy:      Upper Body:      Right upper body: No axillary adenopathy.      Left upper body: No axillary adenopathy.   Neurological:      General: No focal deficit present.      Mental Status: She is alert and oriented to person, place, and time.   Skin:     General: Skin is warm and dry.   Psychiatric:         Mood and Affect: Mood normal.         Behavior: Behavior normal.   Exam conducted with a chaperone present.     Assessment " and Plan   Well woman exam with routine gynecological exam    Urinary frequency  -     Urine culture    Annual exam  Breast and pelvic exam: overall benign. See below for pelvic exam  Patient was counseled on ASCCP guidelines for cervical cytology screening  Cervical screening: Will need pap on return to clinic or in OR   Patient was counseled on current recommendations for breast cancer screening  Mammogram screening: @ 39yo unless new risk factors warrant earlier screening  VitD/Ca supplementation recommendations based on age:  <50yoa - Ca 1000mg/day, VitD 600IU/day  51-70yoa - Ca 1200mg/day, VitD 600IU/day  >71yoa - Ca 1200mg/day, VitD 800IU/day  STD testing: Not performed today      Malpositioned intrauterine device (IUD), initial encounter  IUD removal was attempted in clinic today. The IUD strings were visualized and grasped with a ring forceps and broken off. The body of the IUD was grasped and removal attempted, however, noted to be embedded in the uterus and unable to be removed in clinic   Ultrasound from 2020 reviewed. The IUD was noted to be in the lower uterine segment, no specific ultrasound findings that it was embedded within the myometrium (appeared to be within the endometrial cavity)  Discussed proceeding to OR for Hscope/IUD removal. R/b/a discussed with patient, she desires to proceed to OR   Consents for Hscope/IUD removal and blood transfusion signed     She was counseled to follow up with her PCP for other routine health maintenance    Ashlyn Faith MD  OBGYN, PGY-2

## 2023-01-19 ENCOUNTER — TELEPHONE (OUTPATIENT)
Dept: OBSTETRICS AND GYNECOLOGY | Facility: CLINIC | Age: 28
End: 2023-01-19
Payer: MEDICAID

## 2023-01-19 LAB — BACTERIA UR CULT: NO GROWTH

## 2023-01-19 NOTE — TELEPHONE ENCOUNTER
----- Message from Kendal Cummins sent at 1/19/2023  8:48 AM CST -----      Name of Who is Calling: ANTHONY HERRERA [[id number]]      What is the request in detail: Pt called to get information regarding IUD removal procedure.Please contact to further discuss and advise.          Can the clinic reply by [facility]: Y      What [facility]: [phone]

## 2023-01-19 NOTE — TELEPHONE ENCOUNTER
----- Message from Ahsley Vega sent at 1/19/2023  9:55 AM CST -----  Regarding: Return Call  Who Called: ANTHONY HERRERA [[id number]]            Who Left Message for Patient: Dr. Stewart            Does the patient know what this is regarding? Yes, date for surgery            Best Call Back Number: [phone]            Additional Information:

## 2023-01-20 ENCOUNTER — OFFICE VISIT (OUTPATIENT)
Dept: OBSTETRICS AND GYNECOLOGY | Facility: CLINIC | Age: 28
End: 2023-01-20
Payer: MEDICAID

## 2023-01-20 ENCOUNTER — TELEPHONE (OUTPATIENT)
Dept: OBSTETRICS AND GYNECOLOGY | Facility: CLINIC | Age: 28
End: 2023-01-20
Payer: MEDICAID

## 2023-01-20 VITALS
SYSTOLIC BLOOD PRESSURE: 118 MMHG | HEIGHT: 65 IN | WEIGHT: 136 LBS | BODY MASS INDEX: 22.66 KG/M2 | DIASTOLIC BLOOD PRESSURE: 70 MMHG

## 2023-01-20 DIAGNOSIS — T83.9XXD: Primary | ICD-10-CM

## 2023-01-20 PROCEDURE — 99212 OFFICE O/P EST SF 10 MIN: CPT | Mod: PBBFAC,PN | Performed by: OBSTETRICS & GYNECOLOGY

## 2023-01-20 PROCEDURE — 99499 NO LOS: ICD-10-PCS | Mod: S$PBB,,, | Performed by: OBSTETRICS & GYNECOLOGY

## 2023-01-20 PROCEDURE — 3074F SYST BP LT 130 MM HG: CPT | Mod: CPTII,,, | Performed by: OBSTETRICS & GYNECOLOGY

## 2023-01-20 PROCEDURE — 99999 PR PBB SHADOW E&M-EST. PATIENT-LVL II: CPT | Mod: PBBFAC,,, | Performed by: OBSTETRICS & GYNECOLOGY

## 2023-01-20 PROCEDURE — 3078F PR MOST RECENT DIASTOLIC BLOOD PRESSURE < 80 MM HG: ICD-10-PCS | Mod: CPTII,,, | Performed by: OBSTETRICS & GYNECOLOGY

## 2023-01-20 PROCEDURE — 3078F DIAST BP <80 MM HG: CPT | Mod: CPTII,,, | Performed by: OBSTETRICS & GYNECOLOGY

## 2023-01-20 PROCEDURE — 99999 PR PBB SHADOW E&M-EST. PATIENT-LVL II: ICD-10-PCS | Mod: PBBFAC,,, | Performed by: OBSTETRICS & GYNECOLOGY

## 2023-01-20 PROCEDURE — 99499 UNLISTED E&M SERVICE: CPT | Mod: S$PBB,,, | Performed by: OBSTETRICS & GYNECOLOGY

## 2023-01-20 PROCEDURE — 3074F PR MOST RECENT SYSTOLIC BLOOD PRESSURE < 130 MM HG: ICD-10-PCS | Mod: CPTII,,, | Performed by: OBSTETRICS & GYNECOLOGY

## 2023-01-20 PROCEDURE — 3008F PR BODY MASS INDEX (BMI) DOCUMENTED: ICD-10-PCS | Mod: CPTII,,, | Performed by: OBSTETRICS & GYNECOLOGY

## 2023-01-20 PROCEDURE — 3008F BODY MASS INDEX DOCD: CPT | Mod: CPTII,,, | Performed by: OBSTETRICS & GYNECOLOGY

## 2023-01-20 NOTE — TELEPHONE ENCOUNTER
S/w pt     Scheduled for sx for IUD removal 1/25   Pt states she noticed strings came out while bathing , no device noted , she has been having cramping and bleeding since Wednesday office visit for removal     Advised to come in for evaluation today

## 2023-01-20 NOTE — PROGRESS NOTES
Patient came in for bleeding/cramping after attempted IUD removal in office. She is scheduled for IUD removal in the OR on Wednesday. She declined an exam/further attempt at IUD removal in the office. Patient left prior to examination.

## 2023-01-24 ENCOUNTER — ANESTHESIA EVENT (OUTPATIENT)
Dept: SURGERY | Facility: OTHER | Age: 28
End: 2023-01-24
Payer: MEDICAID

## 2023-01-24 ENCOUNTER — HOSPITAL ENCOUNTER (OUTPATIENT)
Dept: PREADMISSION TESTING | Facility: OTHER | Age: 28
Discharge: HOME | End: 2023-01-24
Attending: OBSTETRICS & GYNECOLOGY
Payer: MEDICAID

## 2023-01-24 VITALS
DIASTOLIC BLOOD PRESSURE: 68 MMHG | OXYGEN SATURATION: 98 % | HEIGHT: 65 IN | SYSTOLIC BLOOD PRESSURE: 126 MMHG | HEART RATE: 80 BPM | WEIGHT: 135 LBS | BODY MASS INDEX: 22.49 KG/M2

## 2023-01-24 DIAGNOSIS — Z01.818 PREOP TESTING: Primary | ICD-10-CM

## 2023-01-24 LAB
BASOPHILS # BLD AUTO: 0.03 K/UL (ref 0–0.2)
BASOPHILS NFR BLD: 0.4 % (ref 0–1.9)
DIFFERENTIAL METHOD: NORMAL
EOSINOPHIL # BLD AUTO: 0 K/UL (ref 0–0.5)
EOSINOPHIL NFR BLD: 0.5 % (ref 0–8)
ERYTHROCYTE [DISTWIDTH] IN BLOOD BY AUTOMATED COUNT: 12.3 % (ref 11.5–14.5)
HCT VFR BLD AUTO: 41.2 % (ref 37–48.5)
HGB BLD-MCNC: 13.9 G/DL (ref 12–16)
IMM GRANULOCYTES # BLD AUTO: 0.02 K/UL (ref 0–0.04)
IMM GRANULOCYTES NFR BLD AUTO: 0.3 % (ref 0–0.5)
LYMPHOCYTES # BLD AUTO: 2.1 K/UL (ref 1–4.8)
LYMPHOCYTES NFR BLD: 26.5 % (ref 18–48)
MCH RBC QN AUTO: 30.2 PG (ref 27–31)
MCHC RBC AUTO-ENTMCNC: 33.7 G/DL (ref 32–36)
MCV RBC AUTO: 90 FL (ref 82–98)
MONOCYTES # BLD AUTO: 0.6 K/UL (ref 0.3–1)
MONOCYTES NFR BLD: 7.8 % (ref 4–15)
NEUTROPHILS # BLD AUTO: 5.1 K/UL (ref 1.8–7.7)
NEUTROPHILS NFR BLD: 64.5 % (ref 38–73)
NRBC BLD-RTO: 0 /100 WBC
PLATELET # BLD AUTO: 268 K/UL (ref 150–450)
PMV BLD AUTO: 10.6 FL (ref 9.2–12.9)
RBC # BLD AUTO: 4.6 M/UL (ref 4–5.4)
WBC # BLD AUTO: 7.96 K/UL (ref 3.9–12.7)

## 2023-01-24 PROCEDURE — 36415 COLL VENOUS BLD VENIPUNCTURE: CPT | Performed by: ANESTHESIOLOGY

## 2023-01-24 PROCEDURE — 85025 COMPLETE CBC W/AUTO DIFF WBC: CPT | Performed by: ANESTHESIOLOGY

## 2023-01-24 RX ORDER — SERTRALINE HYDROCHLORIDE 25 MG/1
25 TABLET, FILM COATED ORAL DAILY
COMMUNITY

## 2023-01-24 RX ORDER — LIDOCAINE HYDROCHLORIDE 10 MG/ML
0.5 INJECTION, SOLUTION EPIDURAL; INFILTRATION; INTRACAUDAL; PERINEURAL ONCE
Status: CANCELLED | OUTPATIENT
Start: 2023-01-24 | End: 2023-01-24

## 2023-01-24 RX ORDER — SODIUM CHLORIDE, SODIUM LACTATE, POTASSIUM CHLORIDE, CALCIUM CHLORIDE 600; 310; 30; 20 MG/100ML; MG/100ML; MG/100ML; MG/100ML
INJECTION, SOLUTION INTRAVENOUS CONTINUOUS
Status: CANCELLED | OUTPATIENT
Start: 2023-01-24

## 2023-01-24 NOTE — ANESTHESIA PREPROCEDURE EVALUATION
01/24/2023  Fredis Moreno is a 27 y.o., female.      Pre-op Assessment    I have reviewed the Patient Summary Reports.     I have reviewed the Nursing Notes. I have reviewed the NPO Status.   I have reviewed the Medications.     Review of Systems  Anesthesia Hx:  PONV Denies Family Hx of Anesthesia complications.  Personal Hx of Anesthesia complications, Post-Operative Nausea/Vomiting   Social:  Non-Smoker    Hematology/Oncology:  Hematology Normal   Oncology Normal     EENT/Dental:EENT/Dental Normal   Cardiovascular:  Cardiovascular Normal     Pulmonary:  Pulmonary Normal    Renal/:  Renal/ Normal     Hepatic/GI:  Hepatic/GI Normal    Musculoskeletal:  Musculoskeletal Normal    Neurological:  Neurology Normal    Endocrine:  Endocrine Normal    Dermatological:  Skin Normal    Psych:   Psychiatric History anxiety          Physical Exam  General: Well nourished, Cooperative, Alert and Oriented    Airway:  Mallampati: II   Mouth Opening: Normal    Dental:  Intact        Anesthesia Plan  Type of Anesthesia, risks & benefits discussed:    Anesthesia Type: Gen Supraglottic Airway  Post Op Pain Control Plan: multimodal analgesia  Informed Consent: Informed consent signed with the Patient and all parties understand the risks and agree with anesthesia plan.  All questions answered.   ASA Score: 1  Anesthesia Plan Notes: CBC today    Ready For Surgery From Anesthesia Perspective.     .

## 2023-01-24 NOTE — DISCHARGE INSTRUCTIONS
Information to Prepare you for your Surgery    PRE-ADMIT TESTING -  [phone]    [address] [facility]          Your surgery has been scheduled at [facility]. We are pleased to have the opportunity to serve you. For Further Information please call [phone].    On the day of surgery please report to the Information Desk on the 1st floor.    CONTACT YOUR PHYSICIAN'S OFFICE THE DAY PRIOR TO YOUR SURGERY TO OBTAIN YOUR ARRIVAL TIME.     The evening before surgery do not eat anything after 9 p.m. ( this includes hard candy, chewing gum and mints).  You may only have GATORADE, POWERADE AND WATER  from 9 p.m. until you leave your home.   DO NOT DRINK ANY LIQUIDS ON THE WAY TO THE HOSPITAL.      Why does your anesthesiologist allow you to drink Gatorade/Powerade before surgery?  Gatorade/Powerade helps to increase your comfort before surgery and to decrease your nausea after surgery. The carbohydrates in Gatorade/Powerade help reduce your body's stress response to surgery.  If you are a diabetic-drink only water prior to surgery.      Current Visitor policy(12/27/2021) - Patients may have 2 visitors pre and post procedure. Only 2 visitors will be allowed in the Surgical building with the patient. No one under the age of 12 will be allowed into the facility.    SPECIAL MEDICATION INSTRUCTIONS: TAKE medications checked off by the Anesthesiologist on your Medication List.    Angiogram Patients: Take medications as instructed by your physician, including aspirin.     Surgery Patients:    If you take ASPIRIN - Your PHYSICIAN/SURGEON will need to inform you IF/OR when you need to stop taking aspirin prior to your surgery.     The week prior to surgery do not ot take any medications containing IBUPROFEN or NSAIDS ( Advil, Motrin, Goodys, BC, Aleve, Naproxen etc) If you are not sure if you should take a medicine please call your surgeon's office.  Ok to take  Tylenol    Do Not Wear any make-up (especially eye make-up) to surgery. Please remove any false eyelashes or eyelash extensions. If you arrive the day of surgery with makeup/eyelashes on you will be required to remove prior to surgery. (There is a risk of corneal abrasions if eye makeup/eyelash extensions are not removed)      Leave all valuables at home.   Do Not wear any jewelry or watches, including any metal in body piercings. Jewelry must be removed prior to coming to the hospital.  There is a possibility that rings that are unable to be removed may be cut off if they are on the surgical extremity.    Please remove all hair extensions, wigs, clips and any other metal accessories/ ornaments from your hair.  These items may pose a flammable/fire risk in Surgery and must be removed.    Do not shave your surgical area at least 5 days prior to your surgery. The surgical prep will be performed at the hospital according to Infection Control regulations.    Contact Lens must be removed before surgery. Either do not wear the contact lens or bring a case and solution for storage.  Please bring a container for eyeglasses or dentures as required.  Bring any paperwork your physician has provided, such as consent forms,  history and physicals, doctor's orders, etc.   Bring comfortable clothes that are loose fitting to wear upon discharge. Take into consideration the type of surgery being performed.  Maintain your diet as advised per your physician the day prior to surgery.      Adequate rest the night before surgery is advised.   Park in the Parking lot behind the hospital or in the [facility] across the street from the parking lot. Parking is complimentary.  If you will be discharged the same day as your procedure, please arrange for a responsible adult to drive you home or to accompany you if traveling by taxi.   YOU WILL NOT BE PERMITTED TO DRIVE OR TO LEAVE THE HOSPITAL ALONE AFTER SURGERY.   If you are  being discharged the same day, it is strongly recommended that you arrange for someone to remain with you for the first 24 hrs following your surgery.    The Surgeon will speak to your family/visitor after your surgery regarding the outcome of your surgery and post op care.  The Surgeon may speak to you after your surgery, but there is a possibility you may not remember the details.  Please check with your family members regarding the conversation with the Surgeon.    We strongly recommend whoever is bringing you home be present for discharge instructions.  This will ensure a thorough understanding for your post op home care.    ALL CHILDREN MUST ALWAYS BE ACCOMPANIED BY AN ADULT.    Visitors-Refer to current Visitor policy handouts.    Thank you for your cooperation.  The Staff of [facility].            Bathing Instructions with Hibiclens    Shower the evening before and morning of your procedure with Chlorhexidine (Hibiclens)  do not use Chlorhexidine on your face or genitals. Do not get in your eyes.  Wash your face with water and your regular face wash/soap  Use your regular shampoo  Apply Chlorhexidine (Hibiclens) directly on your skin or on a wet washcloth and wash gently. When showering: Move away from the shower stream when applying Chlorhexidine (Hibiclens) to avoid rinsing off too soon.  Rinse thoroughly with warm water  Do not dilute Chlorhexidine (Hibiclens)   Dry off as usual, do not use any deodorant, powder, body lotions, perfume, after shave or cologne.

## 2023-01-25 ENCOUNTER — HOSPITAL ENCOUNTER (OUTPATIENT)
Facility: OTHER | Age: 28
Discharge: HOME | End: 2023-01-25
Attending: OBSTETRICS & GYNECOLOGY | Admitting: OBSTETRICS & GYNECOLOGY
Payer: MEDICAID

## 2023-01-25 ENCOUNTER — ANESTHESIA (OUTPATIENT)
Dept: SURGERY | Facility: OTHER | Age: 28
End: 2023-01-25
Payer: MEDICAID

## 2023-01-25 ENCOUNTER — TELEPHONE (OUTPATIENT)
Dept: OBSTETRICS AND GYNECOLOGY | Facility: CLINIC | Age: 28
End: 2023-01-25
Payer: MEDICAID

## 2023-01-25 DIAGNOSIS — Z98.890 STATUS POST HYSTEROSCOPY: Primary | ICD-10-CM

## 2023-01-25 DIAGNOSIS — T83.39XA RETAINED INTRAUTERINE CONTRACEPTIVE DEVICE (IUD): ICD-10-CM

## 2023-01-25 LAB
B-HCG UR QL: NEGATIVE
CTP QC/QA: YES

## 2023-01-25 PROCEDURE — 63600175 PHARM REV CODE 636 W HCPCS: Performed by: ANESTHESIOLOGY

## 2023-01-25 PROCEDURE — 63600175 PHARM REV CODE 636 W HCPCS: Performed by: NURSE ANESTHETIST, CERTIFIED REGISTERED

## 2023-01-25 PROCEDURE — 25000003 PHARM REV CODE 250

## 2023-01-25 PROCEDURE — 81025 URINE PREGNANCY TEST: CPT | Performed by: ANESTHESIOLOGY

## 2023-01-25 PROCEDURE — 58562 HYSTEROSCOPY REMOVE FB: CPT | Mod: ,,, | Performed by: OBSTETRICS & GYNECOLOGY

## 2023-01-25 PROCEDURE — 58562 PR HYSTEROSCOPY,RMV FB: ICD-10-PCS | Mod: ,,, | Performed by: OBSTETRICS & GYNECOLOGY

## 2023-01-25 PROCEDURE — 36000706: Performed by: OBSTETRICS & GYNECOLOGY

## 2023-01-25 PROCEDURE — 25000003 PHARM REV CODE 250: Performed by: NURSE ANESTHETIST, CERTIFIED REGISTERED

## 2023-01-25 PROCEDURE — 00952 ANES VAG PX HYSTSC&/HSG: CPT | Performed by: OBSTETRICS & GYNECOLOGY

## 2023-01-25 PROCEDURE — 37000009 HC ANESTHESIA EA ADD 15 MINS: Performed by: OBSTETRICS & GYNECOLOGY

## 2023-01-25 PROCEDURE — 36000707: Performed by: OBSTETRICS & GYNECOLOGY

## 2023-01-25 PROCEDURE — 71000033 HC RECOVERY, INTIAL HOUR: Performed by: OBSTETRICS & GYNECOLOGY

## 2023-01-25 PROCEDURE — 37000008 HC ANESTHESIA 1ST 15 MINUTES: Performed by: OBSTETRICS & GYNECOLOGY

## 2023-01-25 PROCEDURE — 71000039 HC RECOVERY, EACH ADD'L HOUR: Performed by: OBSTETRICS & GYNECOLOGY

## 2023-01-25 PROCEDURE — 71000015 HC POSTOP RECOV 1ST HR: Performed by: OBSTETRICS & GYNECOLOGY

## 2023-01-25 PROCEDURE — 27201423 OPTIME MED/SURG SUP & DEVICES STERILE SUPPLY: Performed by: OBSTETRICS & GYNECOLOGY

## 2023-01-25 PROCEDURE — 25000003 PHARM REV CODE 250: Performed by: ANESTHESIOLOGY

## 2023-01-25 RX ORDER — OXYCODONE HYDROCHLORIDE 5 MG/1
5 TABLET ORAL
Status: DISCONTINUED | OUTPATIENT
Start: 2023-01-25 | End: 2023-01-25 | Stop reason: HOSPADM

## 2023-01-25 RX ORDER — PROPOFOL 10 MG/ML
VIAL (ML) INTRAVENOUS
Status: DISCONTINUED | OUTPATIENT
Start: 2023-01-25 | End: 2023-01-25

## 2023-01-25 RX ORDER — DIPHENHYDRAMINE HYDROCHLORIDE 50 MG/ML
12.5 INJECTION INTRAMUSCULAR; INTRAVENOUS EVERY 30 MIN PRN
Status: DISCONTINUED | OUTPATIENT
Start: 2023-01-25 | End: 2023-01-25 | Stop reason: HOSPADM

## 2023-01-25 RX ORDER — HYDROMORPHONE HYDROCHLORIDE 2 MG/ML
0.4 INJECTION, SOLUTION INTRAMUSCULAR; INTRAVENOUS; SUBCUTANEOUS EVERY 5 MIN PRN
Status: DISCONTINUED | OUTPATIENT
Start: 2023-01-25 | End: 2023-01-25 | Stop reason: HOSPADM

## 2023-01-25 RX ORDER — MIDAZOLAM HYDROCHLORIDE 1 MG/ML
2 INJECTION INTRAMUSCULAR; INTRAVENOUS ONCE
Status: COMPLETED | OUTPATIENT
Start: 2023-01-25 | End: 2023-01-25

## 2023-01-25 RX ORDER — FENTANYL CITRATE 50 UG/ML
INJECTION, SOLUTION INTRAMUSCULAR; INTRAVENOUS
Status: DISCONTINUED | OUTPATIENT
Start: 2023-01-25 | End: 2023-01-25

## 2023-01-25 RX ORDER — PROCHLORPERAZINE EDISYLATE 5 MG/ML
5 INJECTION INTRAMUSCULAR; INTRAVENOUS EVERY 30 MIN PRN
Status: DISCONTINUED | OUTPATIENT
Start: 2023-01-25 | End: 2023-01-25 | Stop reason: HOSPADM

## 2023-01-25 RX ORDER — SODIUM CHLORIDE 9 MG/ML
INJECTION, SOLUTION INTRAVENOUS CONTINUOUS
Status: DISCONTINUED | OUTPATIENT
Start: 2023-01-25 | End: 2023-01-25 | Stop reason: HOSPADM

## 2023-01-25 RX ORDER — DEXTROMETHORPHAN HYDROBROMIDE, GUAIFENESIN 5; 100 MG/5ML; MG/5ML
650 LIQUID ORAL EVERY 8 HOURS
Qty: 30 TABLET | Refills: 0 | Status: SHIPPED | OUTPATIENT
Start: 2023-01-25

## 2023-01-25 RX ORDER — MEPERIDINE HYDROCHLORIDE 25 MG/ML
12.5 INJECTION INTRAMUSCULAR; INTRAVENOUS; SUBCUTANEOUS ONCE AS NEEDED
Status: DISCONTINUED | OUTPATIENT
Start: 2023-01-25 | End: 2023-01-25 | Stop reason: HOSPADM

## 2023-01-25 RX ORDER — PROPOFOL 10 MG/ML
VIAL (ML) INTRAVENOUS CONTINUOUS PRN
Status: DISCONTINUED | OUTPATIENT
Start: 2023-01-25 | End: 2023-01-25

## 2023-01-25 RX ORDER — LIDOCAINE HCL/PF 100 MG/5ML
SYRINGE (ML) INTRAVENOUS
Status: DISCONTINUED | OUTPATIENT
Start: 2023-01-25 | End: 2023-01-25

## 2023-01-25 RX ORDER — SODIUM CHLORIDE 0.9 % (FLUSH) 0.9 %
3 SYRINGE (ML) INJECTION
Status: DISCONTINUED | OUTPATIENT
Start: 2023-01-25 | End: 2023-01-25 | Stop reason: HOSPADM

## 2023-01-25 RX ORDER — MUPIROCIN 20 MG/G
OINTMENT TOPICAL
Status: DISCONTINUED | OUTPATIENT
Start: 2023-01-25 | End: 2023-01-25 | Stop reason: HOSPADM

## 2023-01-25 RX ORDER — ONDANSETRON 2 MG/ML
INJECTION INTRAMUSCULAR; INTRAVENOUS
Status: DISCONTINUED | OUTPATIENT
Start: 2023-01-25 | End: 2023-01-25

## 2023-01-25 RX ORDER — MIDAZOLAM HYDROCHLORIDE 1 MG/ML
INJECTION INTRAMUSCULAR; INTRAVENOUS
Status: DISCONTINUED | OUTPATIENT
Start: 2023-01-25 | End: 2023-01-25

## 2023-01-25 RX ORDER — LIDOCAINE HYDROCHLORIDE 10 MG/ML
0.5 INJECTION, SOLUTION EPIDURAL; INFILTRATION; INTRACAUDAL; PERINEURAL ONCE
Status: DISCONTINUED | OUTPATIENT
Start: 2023-01-25 | End: 2023-01-25 | Stop reason: HOSPADM

## 2023-01-25 RX ORDER — SODIUM CHLORIDE, SODIUM LACTATE, POTASSIUM CHLORIDE, CALCIUM CHLORIDE 600; 310; 30; 20 MG/100ML; MG/100ML; MG/100ML; MG/100ML
INJECTION, SOLUTION INTRAVENOUS CONTINUOUS
Status: DISCONTINUED | OUTPATIENT
Start: 2023-01-25 | End: 2023-01-25 | Stop reason: HOSPADM

## 2023-01-25 RX ORDER — DEXAMETHASONE SODIUM PHOSPHATE 4 MG/ML
INJECTION, SOLUTION INTRA-ARTICULAR; INTRALESIONAL; INTRAMUSCULAR; INTRAVENOUS; SOFT TISSUE
Status: DISCONTINUED | OUTPATIENT
Start: 2023-01-25 | End: 2023-01-25

## 2023-01-25 RX ADMIN — PROCHLORPERAZINE EDISYLATE 5 MG: 5 INJECTION INTRAMUSCULAR; INTRAVENOUS at 03:01

## 2023-01-25 RX ADMIN — DEXAMETHASONE SODIUM PHOSPHATE 4 MG: 4 INJECTION, SOLUTION INTRAMUSCULAR; INTRAVENOUS at 02:01

## 2023-01-25 RX ADMIN — SODIUM CHLORIDE, SODIUM LACTATE, POTASSIUM CHLORIDE, AND CALCIUM CHLORIDE: 600; 310; 30; 20 INJECTION, SOLUTION INTRAVENOUS at 01:01

## 2023-01-25 RX ADMIN — PROPOFOL 50 MG: 10 INJECTION, EMULSION INTRAVENOUS at 01:01

## 2023-01-25 RX ADMIN — OXYCODONE 5 MG: 5 TABLET ORAL at 03:01

## 2023-01-25 RX ADMIN — LIDOCAINE HYDROCHLORIDE 50 MG: 20 INJECTION, SOLUTION INTRAVENOUS at 01:01

## 2023-01-25 RX ADMIN — FENTANYL CITRATE 50 MCG: 0.05 INJECTION, SOLUTION INTRAMUSCULAR; INTRAVENOUS at 02:01

## 2023-01-25 RX ADMIN — FENTANYL CITRATE 50 MCG: 0.05 INJECTION, SOLUTION INTRAMUSCULAR; INTRAVENOUS at 01:01

## 2023-01-25 RX ADMIN — GLYCOPYRROLATE 0.2 MG: 0.2 INJECTION, SOLUTION INTRAMUSCULAR; INTRAVITREAL at 02:01

## 2023-01-25 RX ADMIN — ONDANSETRON HYDROCHLORIDE 4 MG: 2 INJECTION INTRAMUSCULAR; INTRAVENOUS at 02:01

## 2023-01-25 RX ADMIN — MIDAZOLAM HYDROCHLORIDE 2 MG: 1 INJECTION, SOLUTION INTRAMUSCULAR; INTRAVENOUS at 01:01

## 2023-01-25 RX ADMIN — PROPOFOL 50 MG: 10 INJECTION, EMULSION INTRAVENOUS at 02:01

## 2023-01-25 RX ADMIN — PROPOFOL 150 MCG/KG/MIN: 10 INJECTION, EMULSION INTRAVENOUS at 01:01

## 2023-01-25 RX ADMIN — HYDROMORPHONE HYDROCHLORIDE 0.4 MG: 2 INJECTION, SOLUTION INTRAMUSCULAR; INTRAVENOUS; SUBCUTANEOUS at 03:01

## 2023-01-25 RX ADMIN — MIDAZOLAM HYDROCHLORIDE 2 MG: 1 INJECTION, SOLUTION INTRAMUSCULAR; INTRAVENOUS at 03:01

## 2023-01-25 RX ADMIN — PROPOFOL 20 MG: 10 INJECTION, EMULSION INTRAVENOUS at 01:01

## 2023-01-25 RX ADMIN — MUPIROCIN: 20 OINTMENT TOPICAL at 12:01

## 2023-01-25 NOTE — ANESTHESIA POSTPROCEDURE EVALUATION
Anesthesia Post Evaluation    Patient: Fredis Moreno    Procedure(s) Performed: Procedure(s) (LRB):  HYSTEROSCOPY / REMOVAL IUD (N/A)    Final Anesthesia Type: general      Patient location during evaluation: PACU  Patient participation: Yes- Able to Participate  Level of consciousness: awake and alert  Post-procedure vital signs: reviewed and stable  Pain management: adequate  Airway patency: patent    PONV status at discharge: No PONV  Anesthetic complications: no      Cardiovascular status: blood pressure returned to baseline  Respiratory status: spontaneous ventilation  Hydration status: euvolemic  Follow-up not needed.          Vitals Value Taken Time   /62 01/25/23 1615   Temp 36.4 °C (97.6 °F) 01/25/23 1615   Pulse 63 01/25/23 1615   Resp 16 01/25/23 1615   SpO2 99 % 01/25/23 1615         Event Time   Out of Recovery 16:01:51         Pain/Brandy Score: Pain Rating Prior to Med Admin: 4 (1/25/2023  3:31 PM)  Brandy Score: 10 (1/25/2023  4:15 PM)

## 2023-01-25 NOTE — OP NOTE
Operative Report      Date of Surgery: 01/25/2023     Surgeon: Palma Stewart MD     Assistant: Maddie Duke MD     Pre-Op Diagnosis:   1. Retained IUD    Post-op Diagnosis: s/p IUD removal     Procedure:  1. Hysteroscopy  2. IUD removal     Anesthesia:   MAC    Findings:   - Normal appearing external genitalia  - Normal appearing vaginal and cervical mucosa, free of lesions  - Single-toothed tenaculum applied to the anterior lip of the cervix. Cervical descensus noted to be excellent - likely a very a good candidate for vaginal hysterectomy.  - Uterus dilated without difficulty to easily accommodate a 6 mm hysteroscope  - Hysteroscopically, normal fluffy looking endometrium. Bilateral ostia  visualized. IUD located in cervical canal with left arm embedded in cervical tissue  - Hysteroscopic procedures included grasping of IUD. Tissue surrounding IUD arm cut.  - IUD removed in one piece with stan clamps.   - Scope removed,.  - No specimens sent  - Tenaculum removed; tenaculum puncture sites noted to be bleeding. One figure of eight stitch of 2-0 vicryl used to achieve hemostasis. Hemostasis excellent at the end of case.    EBL: minimal     IVF: per anesthesia note      Urine Output: 50 cc    Fluid Deficit: 370 cc      Specimen: None    Procedure in Detail:  After proper consents were explained and obtained, the patient was taken to the operating room where anesthesia was obtained without difficulty. She was then placed in dorsal lithotomy position in yellowfin stirrups. The patient was then prepped and draped in sterile fashion with dial soap and chlorhexidine 2/2 iodine allergy. A time out was called confirming the patient's name, date of birth, procedure to be performed, allergies, and fire risk. Charlotte speculum was used to visualize the cervix, which was grasped at the anterior lip with the single tooth tenaculum. The tail of IUD was visualized at external cervical os. Attempts were made to remove IUD with  stan clamps, but were unsuccessful. Cervix was then serially dilated with joseph dilators up to #16 joseph to accommodate a 6mm hysteroscope. Under hysteroscope bilateral wings of IUD visualized in cervical canal. Left wing found to be embedded in endocervical tissue. Hysteroscopic scissors were used to sharply cut endocervical tissue surrounding IUD, freeing it. Hysteroscope was removed. Stan clamps were used to pull IUD from cervical canal intact. The tenaculum was removed from the cervix. Sites were noted to be bleeding, so a single figure of 8 suture of 2-0 vicryl was placed. Excellent hemostasis noted at the end of case. The patient tolerated the procedure well. All counts were correct x2. The patient was taken to Recovery area in stable condition.    Maddie Duke MD  OB-GYN, PGY-1

## 2023-01-25 NOTE — INTERVAL H&P NOTE
The patient has been examined and the H&P has been reviewed:    I concur with the findings and no changes have occurred since H&P was written.    Surgery risks, benefits and alternative options discussed and understood by patient/family.    In short, Fredis Moreno is 27 y.o.  with retained IUD here for IUD removal and possible Hscope. Today, pt with no complaints. Questions about surgery answered. To OR for planned procedure.     Maddie Duke MD PGY-1  Obstetrics and Gynecology  [facility]      There are no hospital problems to display for this patient.    
negative

## 2023-01-25 NOTE — PLAN OF CARE
Fredis Moreno has met all discharge criteria from Phase II. Vital Signs are stable, ambulating  without difficulty. Discharge instructions given, patient verbalized understanding. Discharged from facility via wheelchair in stable condition.

## 2023-01-25 NOTE — TRANSFER OF CARE
Anesthesia Transfer of Care Note    Patient: Fredis Moreno    Procedure(s) Performed: Procedure(s) (LRB):  HYSTEROSCOPY / REMOVAL IUD (N/A)    Patient location: PACU    Anesthesia Type: general    Transport from OR: Transported from OR on 6-10 L/min O2 by face mask with adequate spontaneous ventilation    Post pain: adequate analgesia    Post assessment: no apparent anesthetic complications and tolerated procedure well    Post vital signs: stable    Level of consciousness: responds to stimulation    Nausea/Vomiting: no nausea/vomiting    Complications: none    Transfer of care protocol was followed      Last vitals:   Visit Vitals  BP 97/64 (BP Location: Left arm, Patient Position: Lying)   Pulse 77   Temp 36.9 °C (98.5 °F) (Oral)   Resp 20   Wt 61.2 kg (135 lb)   LMP 01/17/2023   SpO2 98%   Breastfeeding No   BMI 22.47 kg/m²

## 2023-01-25 NOTE — DISCHARGE SUMMARY
[facility]  Brief Op Note/Discharge Note  Short Stay    Admit Date: 1/25/2023    Discharge Date: 01/25/2023    Attending Physician: Palma Stewart MD     Surgery Date: 1/25/2023     Surgeon(s) and Role:     * Palma Stewart MD - Primary     * Maddie Duke MD - Resident - Assisting        Pre-op Diagnosis:  Intrauterine device (IUD) migration, initial encounter [T83.32XA]    Post-op Diagnosis:  Post-Op Diagnosis Codes:     * Intrauterine device (IUD) migration, initial encounter [T83.32XA]    Procedure(s) (LRB):  HYSTEROSCOPY / REMOVAL IUD (N/A)    Anesthesia: Choice    Findings/Key Components:   - Normal appearing external genitalia  - Normal appearing vaginal and cervical mucosa, free of lesions  - Single-toothed tenaculum applied to the anterior lip of the cervix. Cervical descensus noted to be excellent - likely a very a good candidate for vaginal hysterectomy.  - Uterus dilated without difficulty to easily accommodate a 6 mm hysteroscope  - Hysteroscopically, normal fluffy looking endometrium. Bilateral ostia  visualized. IUD located in cervical canal with left arm embedded in cervical tissue  - Hysteroscopic procedures included grasping of IUD. Tissue surrounding IUD arm cut.  - IUD removed in one piece with stan clamps.   - Scope removed,.  - No specimens sent  - Tenaculum removed; tenaculum puncture sites noted to be bleeding. One figure of eight stitch of 2-0 vicryl used to achieve hemostasis. Hemostasis excellent at the end of case. hemostatic at the conclusion of the procedure  Estimated Blood Loss: As noted in operative report.          Specimens:   Specimen (24h ago, onward)      None            Discharge Provider: Maddie Duke    Diagnoses:  Active Hospital Problems    Diagnosis  POA    *Status post hysteroscopy/IUD removal  [Z98.890]  Not Applicable      Resolved Hospital Problems   No resolved problems to display.       Discharged Condition: good    Hospital Course:   Patient  was admitted for outpatient procedure as above, and tolerated the procedure well with no complications. Please see operative report for further details. Following the procedure, the patient was awakened from anesthesia and transferred to the recovery area in stable condition. She was discharged to home once ambulating, voiding, tolerating PO intake, and pain was well-controlled. Patient was given routine post-op instructions and prescriptions for pain medication to take as needed. Patient instructed to follow up with Dr. Stewart in 4-6 weeks.    Final Diagnoses: Same as principal problem.    Disposition: Home or Self Care    Follow up/Patient Instructions:    Medications:  Reconciled Home Medications:      Medication List        START taking these medications      acetaminophen 650 MG Tbsr  Commonly known as: TYLENOL  Take 1 tablet (650 mg total) by mouth every 8 (eight) hours.            CONTINUE taking these medications      COLLAGEN MISC  by Misc.(Non-Drug; Combo Route) route.     sertraline 25 MG tablet  Commonly known as: ZOLOFT  Take 25 mg by mouth once daily.            Discharge Procedure Orders   Diet Adult Regular     Lifting restrictions   Order Comments: No lifting > 10 lbs until postoperative appointment     No driving until:   Order Comments: No longer taking narcotics. Able to safely hit brakes without pain.     No dressing needed     Notify your health care provider if you experience any of the following:  temperature >100.4     Notify your health care provider if you experience any of the following:  persistent nausea and vomiting or diarrhea     Notify your health care provider if you experience any of the following:  severe uncontrolled pain     Notify your health care provider if you experience any of the following:  redness, tenderness, or signs of infection (pain, swelling, redness, odor or green/yellow discharge around incision site)     Notify your health care provider if you experience any of  the following:  difficulty breathing or increased cough     Notify your health care provider if you experience any of the following:  severe persistent headache     Notify your health care provider if you experience any of the following:  worsening rash     Notify your health care provider if you experience any of the following:  persistent dizziness, light-headedness, or visual disturbances     Notify your health care provider if you experience any of the following:  increased confusion or weakness     Notify your health care provider if you experience any of the following:   Order Comments: Vaginal bleeding saturating more than one pad per hour for >2 hours     Activity as tolerated      Follow-up Information       Palma Stewart MD. Schedule an appointment as soon as possible for a visit in 4 week(s).    Specialties: Obstetrics, Obstetrics and Gynecology  Why: Postop Appointment  Contact information:  [address] [id number] [phone]                             Maddie Duke MD PGY-1  Obstetrics and Gynecology  [facility]

## 2023-01-25 NOTE — LETTER
January 25, 2023         [address]  [facility] [address]  Phone: [phone]  Fax: [phone]       Patient: Fredis Moreno   YOB: 1995  Date of Visit: 01/25/2023    To Whom It May Concern:    Nicolette Moreno  was at [facility] on 01/25/2023. The patient may return to work/school on *** {With/no:[id number]}. If you have any questions or concerns, or if I can be of further assistance, please do not hesitate to contact me.    Sincerely,    Cora Tenorio RN

## 2023-01-26 VITALS
SYSTOLIC BLOOD PRESSURE: 104 MMHG | RESPIRATION RATE: 16 BRPM | DIASTOLIC BLOOD PRESSURE: 62 MMHG | WEIGHT: 135 LBS | BODY MASS INDEX: 22.47 KG/M2 | OXYGEN SATURATION: 99 % | HEART RATE: 63 BPM | TEMPERATURE: 98 F

## 2023-01-27 ENCOUNTER — TELEPHONE (OUTPATIENT)
Dept: OBSTETRICS AND GYNECOLOGY | Facility: CLINIC | Age: 28
End: 2023-01-27
Payer: MEDICAID

## 2023-01-27 NOTE — TELEPHONE ENCOUNTER
S/W pt     Denied any severe pain, bleeding or cramping    Some light spotting and cramping/ she is taking tylenol    Pt advised to continue to monitor , call ofc if symptoms worsen    Post op scheduled

## 2023-01-27 NOTE — TELEPHONE ENCOUNTER
----- Message from Rigoberto Woodruff LPN sent at 1/26/2023  4:27 PM CST -----  Please call and check on her postop on Friday.

## 2023-07-05 ENCOUNTER — PATIENT MESSAGE (OUTPATIENT)
Dept: RESEARCH | Facility: HOSPITAL | Age: 28
End: 2023-07-05
Payer: MEDICAID

## 2024-06-14 ENCOUNTER — OFFICE VISIT (OUTPATIENT)
Dept: OBSTETRICS AND GYNECOLOGY | Facility: CLINIC | Age: 29
End: 2024-06-14
Payer: MEDICAID

## 2024-06-14 ENCOUNTER — LAB VISIT (OUTPATIENT)
Dept: LAB | Facility: OTHER | Age: 29
End: 2024-06-14
Attending: OBSTETRICS & GYNECOLOGY
Payer: MEDICAID

## 2024-06-14 VITALS
WEIGHT: 137.38 LBS | SYSTOLIC BLOOD PRESSURE: 114 MMHG | DIASTOLIC BLOOD PRESSURE: 89 MMHG | BODY MASS INDEX: 22.86 KG/M2

## 2024-06-14 DIAGNOSIS — Z12.4 CERVICAL CANCER SCREENING: Primary | ICD-10-CM

## 2024-06-14 DIAGNOSIS — Z11.3 SCREENING EXAMINATION FOR STI: ICD-10-CM

## 2024-06-14 DIAGNOSIS — Z01.419 WELL WOMAN EXAM WITH ROUTINE GYNECOLOGICAL EXAM: ICD-10-CM

## 2024-06-14 DIAGNOSIS — Z30.09 BIRTH CONTROL COUNSELING: ICD-10-CM

## 2024-06-14 DIAGNOSIS — N94.6 DYSMENORRHEA: ICD-10-CM

## 2024-06-14 LAB
HIV 1+2 AB+HIV1 P24 AG SERPL QL IA: NEGATIVE
TREPONEMA PALLIDUM IGG+IGM AB [PRESENCE] IN SERUM OR PLASMA BY IMMUNOASSAY: NONREACTIVE

## 2024-06-14 PROCEDURE — 87624 HPV HI-RISK TYP POOLED RSLT: CPT | Performed by: OBSTETRICS & GYNECOLOGY

## 2024-06-14 PROCEDURE — 3074F SYST BP LT 130 MM HG: CPT | Mod: CPTII,,, | Performed by: OBSTETRICS & GYNECOLOGY

## 2024-06-14 PROCEDURE — 36415 COLL VENOUS BLD VENIPUNCTURE: CPT | Performed by: OBSTETRICS & GYNECOLOGY

## 2024-06-14 PROCEDURE — 87491 CHLMYD TRACH DNA AMP PROBE: CPT | Performed by: OBSTETRICS & GYNECOLOGY

## 2024-06-14 PROCEDURE — 87591 N.GONORRHOEAE DNA AMP PROB: CPT | Performed by: OBSTETRICS & GYNECOLOGY

## 2024-06-14 PROCEDURE — 3008F BODY MASS INDEX DOCD: CPT | Mod: CPTII,,, | Performed by: OBSTETRICS & GYNECOLOGY

## 2024-06-14 PROCEDURE — 99212 OFFICE O/P EST SF 10 MIN: CPT | Mod: PBBFAC,PN | Performed by: OBSTETRICS & GYNECOLOGY

## 2024-06-14 PROCEDURE — 99999 PR PBB SHADOW E&M-EST. PATIENT-LVL II: CPT | Mod: PBBFAC,,, | Performed by: OBSTETRICS & GYNECOLOGY

## 2024-06-14 PROCEDURE — 99395 PREV VISIT EST AGE 18-39: CPT | Mod: S$PBB,,, | Performed by: OBSTETRICS & GYNECOLOGY

## 2024-06-14 PROCEDURE — 86593 SYPHILIS TEST NON-TREP QUANT: CPT | Performed by: OBSTETRICS & GYNECOLOGY

## 2024-06-14 PROCEDURE — 3079F DIAST BP 80-89 MM HG: CPT | Mod: CPTII,,, | Performed by: OBSTETRICS & GYNECOLOGY

## 2024-06-14 PROCEDURE — 87389 HIV-1 AG W/HIV-1&-2 AB AG IA: CPT | Performed by: OBSTETRICS & GYNECOLOGY

## 2024-06-14 PROCEDURE — 1159F MED LIST DOCD IN RCRD: CPT | Mod: CPTII,,, | Performed by: OBSTETRICS & GYNECOLOGY

## 2024-06-14 NOTE — PROGRESS NOTES
"Past medical, surgical, social, family, and obstetric histories; medications; prior records and results; and available outside records were reviewed and updated in the EMR.  Pertinent findings were noted below.    Reason for Visit   Well Woman    HPI   28 y.o. female     New patient: Yes, patient of Dr Stewart    Menopausal: No    History of abnormal paps: DENIES  Menstrual history: 13yoa/R/3-4d  Abnormal or postmenopausal bleeding:  Was due for period this week. Started bleeding but doesn't know what is going on because this bleeding has been lighter, pinker than typical. Went to outside ER and "You may need an ultrasound of your uterus to make sure you do not have fibroids or other causes for your abnormal menstrual bleeding"  History of abnormal mammograms:DENIES   Family history of breast or ovarian cancer:  maternal grandmother (breast cancer-70s) & maternal aunt (breast cancer-30s)  Any breast masses, pain, skin changes, or nipple discharge:  c/o breast tenderness x1 month  Possible recent STD exposure: SA with males, 1 new partner in last 6 months, desires full testing  Contraception: None but does not desire pregnancy at this time    Pap: 2024, No recent documented pap  Mammogram:  N/A  Allergies: Amoxicillin, Iodine, Aleve [naproxen sodium], Motrin [ibuprofen], and Shellfish containing products    Review of Systems   Constitutional:  Negative for chills and fever.   Eyes:  Negative for visual disturbance.   Respiratory:  Negative for cough and shortness of breath.    Cardiovascular:  Negative for chest pain and leg swelling.   Gastrointestinal:  Negative for abdominal pain, nausea and vomiting.   Genitourinary:  Positive for menstrual problem. Negative for dysuria, flank pain, frequency, urgency and vaginal bleeding.   Integumentary:  Negative for breast mass.   Neurological:  Negative for syncope and headaches.   Psychiatric/Behavioral:  Negative for depression. The patient is not nervous/anxious.  "   All other systems reviewed and are negative.  Breast: Positive for mastodynia.Negative for mass      Exam   /89   Wt 62.3 kg (137 lb 5.6 oz)   LMP 05/10/2024   BMI 22.86 kg/m²     Physical Exam  Constitutional:       General: She is not in acute distress.     Appearance: Normal appearance. She is not ill-appearing.     Genitourinary:    Vulva, vagina, uterus, right adnexa and left adnexa normal.   No vaginal discharge in the vagina. Cervix is normal. Breasts:     Breasts are soft.     Right: No mass, nipple discharge, skin change or tenderness.      Left: No mass, nipple discharge, skin change or tenderness.   HENT:      Head: Normocephalic and atraumatic.   Pulmonary:      Effort: Pulmonary effort is normal.   Abdominal:      Palpations: Abdomen is soft. There is no mass.      Tenderness: There is no abdominal tenderness.   Musculoskeletal:         General: Normal range of motion.   Lymphadenopathy:      Upper Body:      Right upper body: No axillary adenopathy.      Left upper body: No axillary adenopathy.   Neurological:      General: No focal deficit present.      Mental Status: She is alert and oriented to person, place, and time.   Psychiatric:         Mood and Affect: Mood normal.         Behavior: Behavior normal.         Thought Content: Thought content normal.         Judgment: Judgment normal.   Vitals reviewed.       Assessment and Plan   Cervical cancer screening  -     Liquid-Based Pap Smear, Screening  -     HPV High Risk Genotypes, PCR    Well woman exam with routine gynecological exam    Birth control counseling    Screening examination for STI  -     C. trachomatis/N. gonorrhoeae by AMP DNA  -     HIV 1/2 Ag/Ab (4th Gen); Future; Expected date: 06/14/2024  -     Treponema Pallidium Antibodies IgG, IgM; Future; Expected date: 06/14/2024    Dysmenorrhea  -     US Pelvis Comp with Transvag NON-OB (xpd; Future; Expected date: 06/14/2024      Annual exam  Breast and pelvic exam: performed  "today, see above  Patient was counseled on ASCCP guidelines for cervical cytology screening  Cervical screening: cytology sent  Patient was counseled on current recommendations for breast cancer screening  Mammogram screening: @ 39yo unless new risk factors warrant earlier screening  VitD/Ca supplementation recommendations based on age:  <50yoa - Ca 1000mg/day, VitD 600IU/day  51-70yoa - Ca 1200mg/day, VitD 600IU/day  >71yoa - Ca 1200mg/day, VitD 800IU/day  STD testing: full testing ordered/sent  Contraception: Counseled on options for birth control including combined oral contraceptive pills, patch, NuvaRing, progestin only options including progestin only pills, depo provera, nexplanon, IUD.  Patient is a candidate for estrogen containing contraceptives. Counseled on r/b/i/a of each.  All questions answered. Written information was provided to patient. Patient declines contraception at this time. Counseled on condoms/Plan B OTC and how to use. Patient to let us know if changes her mind and desires another method of contraception.     UPT negative. Reassured patient re: bleeding is her menstrual cycle and re: cyclical mastalgia. She then endorsed pelvic pain. I reassured patient that it is menstrual cramps but she desired to make sure she didn't have "fibroids" > pelvic US ordered for patient reassurance.    She was counseled to follow up with her PCP for other routine health maintenance    "

## 2024-06-15 LAB
C TRACH DNA SPEC QL NAA+PROBE: NOT DETECTED
N GONORRHOEA DNA SPEC QL NAA+PROBE: NOT DETECTED

## 2024-06-27 ENCOUNTER — OFFICE VISIT (OUTPATIENT)
Dept: OBSTETRICS AND GYNECOLOGY | Facility: CLINIC | Age: 29
End: 2024-06-27
Payer: MEDICAID

## 2024-06-27 ENCOUNTER — LAB VISIT (OUTPATIENT)
Dept: LAB | Facility: OTHER | Age: 29
End: 2024-06-27
Attending: OBSTETRICS & GYNECOLOGY
Payer: MEDICAID

## 2024-06-27 VITALS
WEIGHT: 135.38 LBS | DIASTOLIC BLOOD PRESSURE: 75 MMHG | BODY MASS INDEX: 22.53 KG/M2 | SYSTOLIC BLOOD PRESSURE: 125 MMHG

## 2024-06-27 DIAGNOSIS — E34.9 DISORDER OF ENDOCRINE SYSTEM: ICD-10-CM

## 2024-06-27 DIAGNOSIS — Z20.2 POSSIBLE EXPOSURE TO STD: ICD-10-CM

## 2024-06-27 DIAGNOSIS — B37.31 VULVOVAGINAL CANDIDIASIS: Primary | ICD-10-CM

## 2024-06-27 DIAGNOSIS — N76.0 ACUTE VAGINITIS: ICD-10-CM

## 2024-06-27 DIAGNOSIS — B37.31 VULVOVAGINAL CANDIDIASIS: ICD-10-CM

## 2024-06-27 LAB
ESTIMATED AVG GLUCOSE: 103 MG/DL (ref 68–131)
HBA1C MFR BLD: 5.2 % (ref 4–5.6)
HIV 1+2 AB+HIV1 P24 AG SERPL QL IA: NEGATIVE
TREPONEMA PALLIDUM IGG+IGM AB [PRESENCE] IN SERUM OR PLASMA BY IMMUNOASSAY: NONREACTIVE

## 2024-06-27 PROCEDURE — 86593 SYPHILIS TEST NON-TREP QUANT: CPT | Performed by: OBSTETRICS & GYNECOLOGY

## 2024-06-27 PROCEDURE — 36415 COLL VENOUS BLD VENIPUNCTURE: CPT | Performed by: OBSTETRICS & GYNECOLOGY

## 2024-06-27 PROCEDURE — 83036 HEMOGLOBIN GLYCOSYLATED A1C: CPT | Performed by: OBSTETRICS & GYNECOLOGY

## 2024-06-27 PROCEDURE — 3008F BODY MASS INDEX DOCD: CPT | Mod: CPTII,,, | Performed by: OBSTETRICS & GYNECOLOGY

## 2024-06-27 PROCEDURE — 87389 HIV-1 AG W/HIV-1&-2 AB AG IA: CPT | Performed by: OBSTETRICS & GYNECOLOGY

## 2024-06-27 PROCEDURE — 1159F MED LIST DOCD IN RCRD: CPT | Mod: CPTII,,, | Performed by: OBSTETRICS & GYNECOLOGY

## 2024-06-27 PROCEDURE — 3074F SYST BP LT 130 MM HG: CPT | Mod: CPTII,,, | Performed by: OBSTETRICS & GYNECOLOGY

## 2024-06-27 PROCEDURE — 81514 NFCT DS BV&VAGINITIS DNA ALG: CPT | Performed by: OBSTETRICS & GYNECOLOGY

## 2024-06-27 PROCEDURE — 99999 PR PBB SHADOW E&M-EST. PATIENT-LVL II: CPT | Mod: PBBFAC,,, | Performed by: OBSTETRICS & GYNECOLOGY

## 2024-06-27 PROCEDURE — 3078F DIAST BP <80 MM HG: CPT | Mod: CPTII,,, | Performed by: OBSTETRICS & GYNECOLOGY

## 2024-06-27 PROCEDURE — 99212 OFFICE O/P EST SF 10 MIN: CPT | Mod: PBBFAC,PN | Performed by: OBSTETRICS & GYNECOLOGY

## 2024-06-27 PROCEDURE — 1160F RVW MEDS BY RX/DR IN RCRD: CPT | Mod: CPTII,,, | Performed by: OBSTETRICS & GYNECOLOGY

## 2024-06-27 PROCEDURE — 99214 OFFICE O/P EST MOD 30 MIN: CPT | Mod: S$PBB,,, | Performed by: OBSTETRICS & GYNECOLOGY

## 2024-06-27 RX ORDER — FLUCONAZOLE 150 MG/1
150 TABLET ORAL
Qty: 3 TABLET | Refills: 0 | Status: SHIPPED | OUTPATIENT
Start: 2024-06-27 | End: 2024-07-04

## 2024-06-27 RX ORDER — NYSTATIN 100000 U/G
OINTMENT TOPICAL 2 TIMES DAILY
Qty: 30 G | Refills: 1 | Status: SHIPPED | OUTPATIENT
Start: 2024-06-27 | End: 2024-07-11

## 2024-06-27 NOTE — PROGRESS NOTES
Past medical, surgical, social, family, and obstetric histories; medications; prior records and results; and available outside records were reviewed and updated in the EMR.  Pertinent findings were noted below.    Reason for Visit   possible hsv    HPI   28 y.o. female     Patient's last menstrual period was 05/10/2024.    2024: [facility] ED for genital lesions  (-)GC/chlamydia.  (-)UPT.  HSV culture done, but results not available yet.  Per ED note, bumps on vagina  Discharged with valtrex x4 days  Denies any prior hx/o outbreaks  Denies any genital lesions.  Describes labial redness and irritation    Contraception: None  Pap: 2024, NILM/HPV(-)  Mammogram: N/A    Exam   /75   Wt 61.4 kg (135 lb 5.8 oz)   LMP 05/10/2024   BMI 22.53 kg/m²     Physical Exam:               Genitourinary: No external genitalia lesions identified,There is rash on the right labia. There is rash on the left labia. There is vaginal discharge in the vagina.                   Assessment and Plan   Vulvovaginal candidiasis  -     Vaginosis Screen by DNA Probe  -     fluconazole (DIFLUCAN) 150 MG Tab; Take 1 tablet (150 mg total) by mouth Every 3 (three) days. for 3 doses  Dispense: 3 tablet; Refill: 0  -     nystatin (MYCOSTATIN) ointment; Apply topically 2 (two) times daily. for 14 days  Dispense: 30 g; Refill: 1  -     Hemoglobin A1C; Future; Expected date: 2024    Acute vaginitis  -     Vaginosis Screen by DNA Probe    Possible exposure to STD  -     Treponema Pallidium Antibodies IgG, IgM; Future; Expected date: 2024  -     HIV 1/2 Ag/Ab (4th Gen); Future; Expected date: 2024    Disorder of endocrine system  -     Hemoglobin A1C; Future; Expected date: 2024      No genital lesions to swab for HSV.  Erythema of labia.  White curd-like discharge visible at vaginal orifice.  Pt declines speculum exam.  Blind swab of vagina done.  Exam is more consistent with yeast.  Vaginosis screen done.  Will treat  empirically with fluconazole and nystatin.  Complete STD testing  Check A1c for possible prediabetes

## 2024-06-29 LAB
BACTERIAL VAGINOSIS DNA: NEGATIVE
CANDIDA GLABRATA DNA: NEGATIVE
CANDIDA KRUSEI DNA: NEGATIVE
CANDIDA RRNA VAG QL PROBE: POSITIVE
T VAGINALIS RRNA GENITAL QL PROBE: NEGATIVE

## (undated) DEVICE — GLOVE BIOGEL SKINSENSE PI 6.5

## (undated) DEVICE — SOL NACL IRR 3000ML

## (undated) DEVICE — VIDEO RENTAL SERVICE

## (undated) DEVICE — Device

## (undated) DEVICE — SOL IRR SOD CHL .9% POUR

## (undated) DEVICE — SOL PVP-I SCRUB 7.5% 4OZ

## (undated) DEVICE — SET BASIN 48X48IN 6000ML RING

## (undated) DEVICE — PACK FLUENT DISPOSABLE

## (undated) DEVICE — SOL POVIDONE PREP IODINE 4 OZ